# Patient Record
Sex: FEMALE | Race: WHITE | NOT HISPANIC OR LATINO | Employment: OTHER | URBAN - METROPOLITAN AREA
[De-identification: names, ages, dates, MRNs, and addresses within clinical notes are randomized per-mention and may not be internally consistent; named-entity substitution may affect disease eponyms.]

---

## 2018-01-09 DIAGNOSIS — M25.551 HIP PAIN, RIGHT: Primary | ICD-10-CM

## 2018-01-09 DIAGNOSIS — M25.551 RIGHT HIP PAIN: Primary | ICD-10-CM

## 2020-02-21 DIAGNOSIS — R06.02 SHORTNESS OF BREATH: ICD-10-CM

## 2020-11-03 ENCOUNTER — OFFICE VISIT (OUTPATIENT)
Dept: URGENT CARE | Facility: CLINIC | Age: 75
End: 2020-11-03
Payer: MEDICARE

## 2020-11-03 ENCOUNTER — HOSPITAL ENCOUNTER (OUTPATIENT)
Dept: RADIOLOGY | Facility: HOSPITAL | Age: 75
Discharge: HOME | End: 2020-11-03
Attending: PHYSICIAN ASSISTANT
Payer: MEDICARE

## 2020-11-03 VITALS
TEMPERATURE: 98 F | BODY MASS INDEX: 28.32 KG/M2 | OXYGEN SATURATION: 98 % | SYSTOLIC BLOOD PRESSURE: 148 MMHG | WEIGHT: 170 LBS | DIASTOLIC BLOOD PRESSURE: 87 MMHG | HEART RATE: 84 BPM | HEIGHT: 65 IN
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
## 2020-11-03 DIAGNOSIS — S42.91XA CLOSED FRACTURE OF RIGHT SHOULDER, INITIAL ENCOUNTER: Primary | ICD-10-CM

## 2020-11-03 DIAGNOSIS — T14.8XXA FRACTURE: ICD-10-CM

## 2020-11-03 DIAGNOSIS — S00.81XA ABRASION OF FACE, INITIAL ENCOUNTER: ICD-10-CM

## 2020-11-03 DIAGNOSIS — T14.90XA TRAUMA: ICD-10-CM

## 2020-11-03 DIAGNOSIS — T14.8XXA FRACTURE: Primary | ICD-10-CM

## 2020-11-03 PROBLEM — E03.9 ACQUIRED HYPOTHYROIDISM: Status: ACTIVE | Noted: 2020-11-03

## 2020-11-03 PROBLEM — E78.00 PURE HYPERCHOLESTEROLEMIA: Status: ACTIVE | Noted: 2020-11-03

## 2020-11-03 PROBLEM — I10 ESSENTIAL HYPERTENSION: Status: ACTIVE | Noted: 2020-11-03

## 2020-11-03 PROCEDURE — 73200 CT SHOULDER WITHOUT CONTRAST RIGHT: ICD-10-PCS | Mod: 26,RT,, | Performed by: RADIOLOGY

## 2020-11-03 PROCEDURE — 96372 THER/PROPH/DIAG INJ SC/IM: CPT | Mod: S$GLB,,, | Performed by: FAMILY MEDICINE

## 2020-11-03 PROCEDURE — 99215 PR OFFICE/OUTPT VISIT, EST, LEVL V, 40-54 MIN: ICD-10-PCS | Mod: 25,S$GLB,, | Performed by: FAMILY MEDICINE

## 2020-11-03 PROCEDURE — 73030 X-RAY EXAM OF SHOULDER: CPT | Mod: FY,RT,S$GLB, | Performed by: RADIOLOGY

## 2020-11-03 PROCEDURE — 96372 PR INJECTION,THERAP/PROPH/DIAG2ST, IM OR SUBCUT: ICD-10-PCS | Mod: S$GLB,,, | Performed by: FAMILY MEDICINE

## 2020-11-03 PROCEDURE — 73200 CT UPPER EXTREMITY W/O DYE: CPT | Mod: 26,RT,, | Performed by: RADIOLOGY

## 2020-11-03 PROCEDURE — 99215 OFFICE O/P EST HI 40 MIN: CPT | Mod: 25,S$GLB,, | Performed by: FAMILY MEDICINE

## 2020-11-03 PROCEDURE — 73562 X-RAY EXAM OF KNEE 3: CPT | Mod: FY,RT,S$GLB, | Performed by: RADIOLOGY

## 2020-11-03 PROCEDURE — 73200 CT UPPER EXTREMITY W/O DYE: CPT | Mod: TC,RT

## 2020-11-03 PROCEDURE — 73562 XR KNEE 3 VIEW RIGHT: ICD-10-PCS | Mod: FY,RT,S$GLB, | Performed by: RADIOLOGY

## 2020-11-03 PROCEDURE — 73030 XR SHOULDER COMPLETE 2 OR MORE VIEWS RIGHT: ICD-10-PCS | Mod: FY,RT,S$GLB, | Performed by: RADIOLOGY

## 2020-11-03 RX ORDER — LOSARTAN POTASSIUM 25 MG/1
TABLET ORAL
Status: ON HOLD | COMMUNITY
Start: 2020-08-20 | End: 2022-06-13

## 2020-11-03 RX ORDER — LEVOTHYROXINE SODIUM 50 UG/1
TABLET ORAL
COMMUNITY
Start: 2020-09-04

## 2020-11-03 RX ORDER — HYDROCHLOROTHIAZIDE 25 MG/1
TABLET ORAL
Status: ON HOLD | COMMUNITY
Start: 2020-08-19 | End: 2022-06-13

## 2020-11-03 RX ORDER — FLUTICASONE PROPIONATE 50 MCG
SPRAY, SUSPENSION (ML) NASAL
COMMUNITY
Start: 2020-08-20

## 2020-11-03 RX ORDER — KETOROLAC TROMETHAMINE 30 MG/ML
30 INJECTION, SOLUTION INTRAMUSCULAR; INTRAVENOUS
Status: COMPLETED | OUTPATIENT
Start: 2020-11-03 | End: 2020-11-03

## 2020-11-03 RX ORDER — METOPROLOL SUCCINATE 50 MG/1
TABLET, EXTENDED RELEASE ORAL NIGHTLY
COMMUNITY
Start: 2020-08-20

## 2020-11-03 RX ORDER — OMEPRAZOLE 20 MG/1
CAPSULE, DELAYED RELEASE ORAL EVERY MORNING
COMMUNITY
Start: 2020-10-07

## 2020-11-03 RX ORDER — SERTRALINE HYDROCHLORIDE 50 MG/1
TABLET, FILM COATED ORAL NIGHTLY
COMMUNITY
Start: 2020-09-11

## 2020-11-03 RX ORDER — HYDROCODONE BITARTRATE AND ACETAMINOPHEN 7.5; 325 MG/1; MG/1
1 TABLET ORAL EVERY 6 HOURS PRN
Qty: 30 TABLET | Refills: 0 | Status: SHIPPED | OUTPATIENT
Start: 2020-11-03 | End: 2022-06-07 | Stop reason: CLARIF

## 2020-11-03 RX ORDER — ATORVASTATIN CALCIUM 20 MG/1
TABLET, FILM COATED ORAL NIGHTLY
COMMUNITY
Start: 2020-09-10

## 2020-11-03 RX ORDER — CETIRIZINE HYDROCHLORIDE 10 MG/1
TABLET ORAL NIGHTLY PRN
COMMUNITY
Start: 2020-07-31

## 2020-11-03 RX ADMIN — KETOROLAC TROMETHAMINE 30 MG: 30 INJECTION, SOLUTION INTRAMUSCULAR; INTRAVENOUS at 12:11

## 2020-11-03 NOTE — PROGRESS NOTES
"Subjective:       Patient ID: Brunilda Bass is a 75 y.o. female.    Vitals:  height is 5' 5" (1.651 m) and weight is 77.1 kg (170 lb). Her temperature is 97.7 °F (36.5 °C). Her blood pressure is 148/87 (abnormal) and her pulse is 84. Her oxygen saturation is 98%.     Chief Complaint: Shoulder Injury (right)    Patient fell down 1 stair after voting this morning. Pt denies any kidney problems , she admits to GERD for which she takes meds but no hx of ulcers    Fall  Fall occurred: fell down 1 stair and fell onto concrete. She landed on concrete. The point of impact was the right shoulder and right knee. The pain is present in the right knee and right shoulder. The pain is at a severity of 8/10. The pain is severe. The symptoms are aggravated by movement. Pertinent negatives include no abdominal pain, hematuria or loss of consciousness. She has tried ice for the symptoms. The treatment provided mild relief.       Constitution: Negative for fatigue.   HENT: Negative for facial swelling and facial trauma.    Neck: Negative for neck stiffness.   Cardiovascular: Negative for chest trauma.   Eyes: Negative for eye trauma, double vision and blurred vision.   Gastrointestinal: Negative for abdominal trauma, abdominal pain and rectal bleeding.   Genitourinary: Negative for hematuria, missed menses, genital trauma and pelvic pain.   Musculoskeletal: Positive for joint pain. Negative for pain, trauma, joint swelling and abnormal ROM of joint.   Skin: Negative for color change, wound, abrasion, laceration and bruising.   Neurological: Negative for dizziness, history of vertigo, light-headedness, coordination disturbances, altered mental status and loss of consciousness.   Hematologic/Lymphatic: Negative for history of bleeding disorder.   Psychiatric/Behavioral: Negative for altered mental status.       Objective:      Physical Exam   Constitutional: She is oriented to person, place, and time. obesity  HENT:   Head: " Normocephalic.   Nose:      Comments: Small abrasion at bridge of nose  Mouth/Throat: Mucous membranes are moist.      Comments: Swelling of upper lip.   Eyes: Pupils are equal, round, and reactive to light. extraocular movement intact  Neck: Normal range of motion. Neck supple. No muscular tenderness present.   Abdominal: Normal appearance.   Musculoskeletal:         General: Tenderness and signs of injury present. No swelling or deformity.      Comments: Pain on palpation of patella, abrasion on the surface of the patella as well. Otherwise grossly normal ROM no palpable effusion in the joint,  Left knee with superficial abrasions over the surface of the patella as well.   Pt unable to stand or walk without assisitance.     Rt shoulder with no obvious deformity-though possibly subluxed inferiorly? Pt unable to abduct the shoulder greater than 20 degrees. Mild diffuse pain on palpation over the shoulder   Good radial and ulnar pulses at the wrist   Neurological: She is alert and oriented to person, place, and time.   Skin: Skin is warm and dry. Capillary refill takes 2 to 3 seconds. Psychiatric: Her behavior is normal. Mood, judgment and thought content normal.   Nursing note and vitals reviewed.        Assessment:       1. Closed fracture of right shoulder, initial encounter    2. Trauma    3. Abrasion of face, initial encounter    4. Fall, initial encounter        Plan:         Closed fracture of right shoulder, initial encounter  -     Ambulatory referral/consult to Orthopedics  -     SLING FOR HOME USE  -     HYDROcodone-acetaminophen (NORCO) 7.5-325 mg per tablet; Take 1 tablet by mouth every 6 (six) hours as needed for Pain.  Dispense: 30 tablet; Refill: 0    Trauma  -     X-ray Shoulder 2 or More Views Right; Future; Expected date: 11/03/2020  -     XR KNEE 3 VIEW RIGHT; Future; Expected date: 11/03/2020  -     ketorolac injection 30 mg    Abrasion of face, initial encounter    Fall, initial  encounter      Patient Instructions     Shoulder Fracture  You have a break (fracture) of the shoulder. This may be a small crack in the bone. Or it may be a major break with the broken parts pushed out of position.     If you have only a crack in the bone and no bone fragments are out of place, you will probably be treated with a shoulder immobilizer. This is a special type of sling. Casts are usually not used for this type of fracture. Your bone should heal in 4 to 8 weeks. More serious injuries may need surgery to put the bones back into the correct position for healing.  Home care  Follow these tips to care for yourself at home:  · Leave the shoulder immobilizer in place. This will support the injured arm at your side. This is the best position for the bone to heal.  · The shoulder immobilizer is adjustable. If it becomes loose, adjust it so that your forearm is level with the ground (horizontal). Your hand should be level with your elbow.  · Apply an ice pack to the injured area for 20 minutes every 1 to 2 hours the first day. You can make an ice pack by putting ice cubes in a plastic bag. A bag of frozen peas or something similar works well too. Wrap the bag in a towel before putting it on your shoulder. Continue with ice packs 3 to 4 times a day for the next 2 to 3 days. Then use the ice as needed to relieve pain and swelling.  · You may take acetaminophen or ibuprofen to relieve pain, unless another pain medicine was prescribed. If you have chronic liver or kidney disease or ever had a stomach ulcer or gastrointestinal bleeding, talk with your doctor before using these medicines.  · Dont take the sling off before your next exam unless you were told to do so. Ask if you should move your elbow, wrist, and hand.  Follow-up care  Follow up with your healthcare provider, or as advised.  A shoulder joint will become stiff if left in a sling for too long. Ask your doctor when it is safe to begin range-of-motion  exercises.  When to seek medical advice  Call your healthcare provider right away if any of these occur:  · Your fingers become swollen, cold, blue, numb, or tingly  · Your shoulder or upper arm swells a lot or looks very bruised  · The pain in your shoulder gets worse  · The splint or immobilizer breaks  · You have a fever or chills  Date Last Reviewed: 8/1/2016  © 9429-5607 [employer]. [address] [place] [address], [place] [address]. All rights reserved. This information is not intended as a substitute for professional medical care. Always follow your healthcare professional's instructions.

## 2020-11-03 NOTE — PATIENT INSTRUCTIONS
Shoulder Fracture  You have a break (fracture) of the shoulder. This may be a small crack in the bone. Or it may be a major break with the broken parts pushed out of position.     If you have only a crack in the bone and no bone fragments are out of place, you will probably be treated with a shoulder immobilizer. This is a special type of sling. Casts are usually not used for this type of fracture. Your bone should heal in 4 to 8 weeks. More serious injuries may need surgery to put the bones back into the correct position for healing.  Home care  Follow these tips to care for yourself at home:  · Leave the shoulder immobilizer in place. This will support the injured arm at your side. This is the best position for the bone to heal.  · The shoulder immobilizer is adjustable. If it becomes loose, adjust it so that your forearm is level with the ground (horizontal). Your hand should be level with your elbow.  · Apply an ice pack to the injured area for 20 minutes every 1 to 2 hours the first day. You can make an ice pack by putting ice cubes in a plastic bag. A bag of frozen peas or something similar works well too. Wrap the bag in a towel before putting it on your shoulder. Continue with ice packs 3 to 4 times a day for the next 2 to 3 days. Then use the ice as needed to relieve pain and swelling.  · You may take acetaminophen or ibuprofen to relieve pain, unless another pain medicine was prescribed. If you have chronic liver or kidney disease or ever had a stomach ulcer or gastrointestinal bleeding, talk with your doctor before using these medicines.  · Dont take the sling off before your next exam unless you were told to do so. Ask if you should move your elbow, wrist, and hand.  Follow-up care  Follow up with your healthcare provider, or as advised.  A shoulder joint will become stiff if left in a sling for too long. Ask your doctor when it is safe to begin range-of-motion exercises.  When to seek medical  advice  Call your healthcare provider right away if any of these occur:  · Your fingers become swollen, cold, blue, numb, or tingly  · Your shoulder or upper arm swells a lot or looks very bruised  · The pain in your shoulder gets worse  · The splint or immobilizer breaks  · You have a fever or chills  Date Last Reviewed: 8/1/2016  © 2457-8986 [employer]. [address] [place] [address], [place] [address]. All rights reserved. This information is not intended as a substitute for professional medical care. Always follow your healthcare professional's instructions.

## 2020-11-04 ENCOUNTER — OFFICE VISIT (OUTPATIENT)
Dept: ORTHOPEDICS | Facility: CLINIC | Age: 75
End: 2020-11-04
Payer: MEDICARE

## 2020-11-04 ENCOUNTER — HOSPITAL ENCOUNTER (OUTPATIENT)
Dept: RADIOLOGY | Facility: HOSPITAL | Age: 75
Discharge: HOME | End: 2020-11-04
Attending: PHYSICIAN ASSISTANT
Payer: MEDICARE

## 2020-11-04 VITALS
BODY MASS INDEX: 28.32 KG/M2 | TEMPERATURE: 97 F | SYSTOLIC BLOOD PRESSURE: 138 MMHG | DIASTOLIC BLOOD PRESSURE: 80 MMHG | WEIGHT: 170 LBS | RESPIRATION RATE: 18 BRPM | HEART RATE: 79 BPM | HEIGHT: 65 IN

## 2020-11-04 DIAGNOSIS — S42.291A CLOSED 4-PART FRACTURE OF PROXIMAL HUMERUS, RIGHT, INITIAL ENCOUNTER: ICD-10-CM

## 2020-11-04 DIAGNOSIS — T14.8XXA FRACTURE: Primary | ICD-10-CM

## 2020-11-04 DIAGNOSIS — T14.8XXA FRACTURE: ICD-10-CM

## 2020-11-04 DIAGNOSIS — S82.031A DISPLACED TRANSVERSE FRACTURE OF RIGHT PATELLA, INITIAL ENCOUNTER FOR CLOSED FRACTURE: ICD-10-CM

## 2020-11-04 PROCEDURE — 99204 OFFICE O/P NEW MOD 45 MIN: CPT | Mod: S$PBB,,, | Performed by: PHYSICIAN ASSISTANT

## 2020-11-04 PROCEDURE — 99213 OFFICE O/P EST LOW 20 MIN: CPT | Mod: PBBFAC,25 | Performed by: PHYSICIAN ASSISTANT

## 2020-11-04 PROCEDURE — 99204 PR OFFICE/OUTPT VISIT, NEW, LEVL IV, 45-59 MIN: ICD-10-PCS | Mod: S$PBB,,, | Performed by: PHYSICIAN ASSISTANT

## 2020-11-04 PROCEDURE — 73060 X-RAY EXAM OF HUMERUS: CPT | Mod: 26,RT,, | Performed by: RADIOLOGY

## 2020-11-04 PROCEDURE — 99999 PR PBB SHADOW E&M-EST. PATIENT-LVL III: ICD-10-PCS | Mod: PBBFAC,,, | Performed by: PHYSICIAN ASSISTANT

## 2020-11-04 PROCEDURE — 99999 PR PBB SHADOW E&M-EST. PATIENT-LVL III: CPT | Mod: PBBFAC,,, | Performed by: PHYSICIAN ASSISTANT

## 2020-11-04 PROCEDURE — 73060 XR HUMERUS 2 VIEW RIGHT: ICD-10-PCS | Mod: 26,RT,, | Performed by: RADIOLOGY

## 2020-11-04 PROCEDURE — 73060 X-RAY EXAM OF HUMERUS: CPT | Mod: TC,RT

## 2020-11-04 RX ORDER — METHOCARBAMOL 500 MG/1
500 TABLET, FILM COATED ORAL 4 TIMES DAILY
Qty: 40 TABLET | Refills: 0 | Status: SHIPPED | OUTPATIENT
Start: 2020-11-04 | End: 2020-11-14

## 2020-11-04 NOTE — LETTER
November 4, 2020      Tatiana White,   [address] [facility] [address]           [facility] 5th Fl  [address], [address]  [facility] [address]  Phone: [phone]          Patient: Brunilda Bass   MR Number: [id number]   YOB: 1945   Date of Visit: 11/4/2020       Dear Dr. Tatiana White:    Thank you for referring Brunilda Bass to me for evaluation. Attached you will find relevant portions of my assessment and plan of care.    If you have questions, please do not hesitate to call me. I look forward to following Brunilda Bass along with you.    Sincerely,    Rachel Ruano PA-C    Enclosure  CC:  No Recipients    If you would like to receive this communication electronically, please contact [email or web address] or [phone] to request more information on [employer] Link access.    For providers and/or their staff who would like to refer a patient to [facility], please contact us through our one-stop-shop provider referral line, [facility], at [phone].    If you feel you have received this communication in error or would no longer like to receive these types of communications, please e-mail [email or web address]

## 2020-11-04 NOTE — PROGRESS NOTES
Subjective:      Patient ID: Brunilda Bass is a 75 y.o. female.    Chief Complaint: No chief complaint on file.    HPI    Patient is a 75 year old female with PMHX of HTN, controlled following up from urgent care because on 11/03/2020 fell from standing height after voting. Patients injuries included  1. 4 part comminuted, mildly impacted, fracture at the surgical humeral neck with anterior displacement of the humerus relative to the humeral head. Patient has been treated in a sling.   2. Nondisplaced right transverse patella fracture. weight bearing as tolerated, no issues, abrasion to anterior knee.   She reports that her pain is controlled. She is taking extra strength tylenol for pain. Denied numbness or tingling.      Lives at home with daughter   Independent community ambulator, no gait aids  Denies history of stroke, heart attack, cancer, blood clot, diabetes  Denies tobacco use  Denied chronic pain medication use prior to injury    Review of Systems   Constitution: Negative for chills and fever.   Cardiovascular: Negative for chest pain.   Respiratory: Negative for cough and shortness of breath.    Skin: Negative for color change, dry skin, itching, nail changes, poor wound healing and rash.   Musculoskeletal: Positive for falls.        Right patella fracture and humerus fracture   Neurological: Negative for dizziness.   Psychiatric/Behavioral: Negative for altered mental status. The patient is not nervous/anxious.    All other systems reviewed and are negative.        Objective:      General    Constitutional: She is oriented to person, place, and time. She appears well-developed and well-nourished. No distress.   HENT:   Head: Atraumatic.   Eyes: Conjunctivae are normal.   Cardiovascular: Normal rate.    Pulmonary/Chest: Effort normal.   Neurological: She is alert and oriented to person, place, and time.   Psychiatric: She has a normal mood and affect. Her behavior is normal.           Right Knee Exam      Comments:  Abrasion anterior knee  full range of motion  no TTP    Left Knee Exam     Comments:  Abrasion anterior knee  full range of motion  no TTPRight Shoulder Exam     Inspection/Observation   Swelling: present  Bruising: present    Comments:  Arrived to clinic with her daughter, sling in place, removed for exam  full range of motion of fingers wrist and elbow no pain.   range of motion shoulder not tested due to fracture.   NVI     RADS: reviewed by myself and   SHOULDER: comminuted, mildly impacted, fracture at the surgical humeral neck with anterior displacement of the humerus relative to the humeral head.  There is also fracture of the greater tuberosity.  Possible fracture at the anatomical neck as well.      KNEE:   Non displaced transverse patella fracture.           Assessment:       Encounter Diagnoses   Name Primary?    Fracture Yes    Closed 4-part fracture of proximal humerus, right, initial encounter     Displaced transverse fracture of right patella, initial encounter for closed fracture           Plan:     Discussed treatment options/ plan with the patient. Per     HUMERUS:  operative ( ORIF vs reverse total shoulder) vs non-operative treatment. At this time she would like to do non-operative treatment. She understands that she is at increased risk of development of arthritis and that she may require surgical intervention in the future.    She will continue sling and is to me NWB    KNEE: non operative treatment   Knee brace locked in extension weight bearing as tolerated in brace.    RICE to reduce swelling and pain.     Pain medication: will continue OTC Tylenol, prescription written for robaxin if needed.   return to clinic in 1 week at that time x-ray of her shoulder 2 view AP scap Y and her knee knee 2 view AP lat NWB is needed.

## 2020-11-09 ENCOUNTER — TELEPHONE (OUTPATIENT)
Dept: ORTHOPEDICS | Facility: CLINIC | Age: 75
End: 2020-11-09

## 2020-11-09 NOTE — TELEPHONE ENCOUNTER
Left message for patient to return call.  Regarding appointment on 11/11/20. Pt can come at 10:00 am on 11/11/20.

## 2020-11-09 NOTE — TELEPHONE ENCOUNTER
----- Message from Amanda Hannon sent at 11/9/2020 10:53 AM CST -----        Patient would like to have an earlier appt that day. Possible opening 12:30 that day wont let me schedule.  Please contact daughter to advise as to if can change to an earlier appt that day.  She is concerned about getting home with the 1:45 appt to be there when her kids get home      Daughter can be reached @# [phone]  (Yamile)

## 2020-11-11 ENCOUNTER — OFFICE VISIT (OUTPATIENT)
Dept: ORTHOPEDICS | Facility: CLINIC | Age: 75
End: 2020-11-11
Payer: MEDICARE

## 2020-11-11 ENCOUNTER — HOSPITAL ENCOUNTER (OUTPATIENT)
Dept: RADIOLOGY | Facility: HOSPITAL | Age: 75
Discharge: HOME | End: 2020-11-11
Attending: PHYSICIAN ASSISTANT
Payer: MEDICARE

## 2020-11-11 DIAGNOSIS — S42.291A CLOSED 4-PART FRACTURE OF PROXIMAL HUMERUS, RIGHT, INITIAL ENCOUNTER: Primary | ICD-10-CM

## 2020-11-11 DIAGNOSIS — M25.561 ACUTE PAIN OF RIGHT KNEE: ICD-10-CM

## 2020-11-11 DIAGNOSIS — S42.291A CLOSED 4-PART FRACTURE OF PROXIMAL HUMERUS, RIGHT, INITIAL ENCOUNTER: ICD-10-CM

## 2020-11-11 DIAGNOSIS — S82.031A DISPLACED TRANSVERSE FRACTURE OF RIGHT PATELLA, INITIAL ENCOUNTER FOR CLOSED FRACTURE: ICD-10-CM

## 2020-11-11 PROCEDURE — 73030 X-RAY EXAM OF SHOULDER: CPT | Mod: TC,RT

## 2020-11-11 PROCEDURE — 99999 PR PBB SHADOW E&M-EST. PATIENT-LVL III: CPT | Mod: PBBFAC,,, | Performed by: PHYSICIAN ASSISTANT

## 2020-11-11 PROCEDURE — 73560 X-RAY EXAM OF KNEE 1 OR 2: CPT | Mod: 26,RT,, | Performed by: RADIOLOGY

## 2020-11-11 PROCEDURE — 99999 PR PBB SHADOW E&M-EST. PATIENT-LVL III: ICD-10-PCS | Mod: PBBFAC,,, | Performed by: PHYSICIAN ASSISTANT

## 2020-11-11 PROCEDURE — 99213 OFFICE O/P EST LOW 20 MIN: CPT | Mod: PBBFAC,25 | Performed by: PHYSICIAN ASSISTANT

## 2020-11-11 PROCEDURE — 73030 X-RAY EXAM OF SHOULDER: CPT | Mod: 26,RT,, | Performed by: RADIOLOGY

## 2020-11-11 PROCEDURE — 73560 XR KNEE 1 OR 2 VIEW RIGHT: ICD-10-PCS | Mod: 26,RT,, | Performed by: RADIOLOGY

## 2020-11-11 PROCEDURE — 73030 XR SHOULDER COMPLETE 2 OR MORE VIEWS RIGHT: ICD-10-PCS | Mod: 26,RT,, | Performed by: RADIOLOGY

## 2020-11-11 PROCEDURE — 73560 X-RAY EXAM OF KNEE 1 OR 2: CPT | Mod: TC,RT

## 2020-11-11 PROCEDURE — 99214 OFFICE O/P EST MOD 30 MIN: CPT | Mod: S$PBB,,, | Performed by: PHYSICIAN ASSISTANT

## 2020-11-11 PROCEDURE — 99214 PR OFFICE/OUTPT VISIT, EST, LEVL IV, 30-39 MIN: ICD-10-PCS | Mod: S$PBB,,, | Performed by: PHYSICIAN ASSISTANT

## 2020-11-11 NOTE — PROGRESS NOTES
Subjective:      Patient ID: Brunilda Bass is a 75 y.o. female.    Chief Complaint: No chief complaint on file.    HPI    Patient is a 75 year old female with PMHX of HTN, controlled following up from urgent care because on 11/03/2020 fell from standing height after voting. Patients injuries included  1. 4 part comminuted, mildly impacted, fracture at the surgical humeral neck with anterior displacement of the humerus relative to the humeral head. Patient has been treated in a sling.   2. Nondisplaced right transverse patella fracture. weight bearing as tolerated, no issues, abrasion to anterior knee.   She reports that her pain is controlled. She is taking extra strength tylenol for pain. Denied numbness or tingling.   She has not been really wearing the knee brace but has tried to keep her knee straight. She reports that both the pain in her knee and shoulder are better.      Lives at home with daughter   Independent community ambulator, no gait aids  Denies history of stroke, heart attack, cancer, blood clot, diabetes  Denies tobacco use  Denied chronic pain medication use prior to injury    Review of Systems   Constitution: Negative for chills and fever.   Cardiovascular: Negative for chest pain.   Respiratory: Negative for cough and shortness of breath.    Skin: Negative for color change, dry skin, itching, nail changes, poor wound healing and rash.   Musculoskeletal: Positive for falls.        Right patella fracture and humerus fracture   Neurological: Negative for dizziness.   Psychiatric/Behavioral: Negative for altered mental status. The patient is not nervous/anxious.    All other systems reviewed and are negative.        Objective:      General    Constitutional: She is oriented to person, place, and time. She appears well-developed and well-nourished. No distress.   HENT:   Head: Atraumatic.   Eyes: Conjunctivae are normal.   Cardiovascular: Normal rate.    Pulmonary/Chest: Effort normal.   Neurological:  She is alert and oriented to person, place, and time.   Psychiatric: She has a normal mood and affect. Her behavior is normal.           Right Knee Exam     Comments:  Abrasion anterior knee  full range of motion  no TTP    Left Knee Exam     Comments:  Abrasion anterior knee  full range of motion  no TTPRight Shoulder Exam     Inspection/Observation   Swelling: present  Bruising: present    Comments:  Arrived to clinic with her daughter, sling in place, removed for exam  full range of motion of fingers wrist and elbow no pain.   range of motion shoulder not tested due to fracture.   NVI     RADS: reviewed by myself and   SHOULDER: comminuted, mildly impacted, fracture at the surgical humeral neck with anterior displacement of the humerus relative to the humeral head.  There is also fracture of the greater tuberosity.  Possible fracture at the anatomical neck as well.      KNEE:   Non displaced transverse patella fracture.     Assessment:       Encounter Diagnoses   Name Primary?    Closed 4-part fracture of proximal humerus, right, initial encounter Yes    Acute pain of right knee     Displaced transverse fracture of right patella, initial encounter for closed fracture           Plan:     Discussed treatment options/ plan with the patient. Per     HUMERUS:  operative ( ORIF vs reverse total shoulder) vs non-operative treatment. At this time she would like to do non-operative treatment. She understands that she is at increased risk of development of arthritis and that she may require surgical intervention in the future.    She will continue sling and is to me NWB ok to move finger wrist and elbow.     KNEE: non operative treatment   Knee brace begin range of motion has been stable even though not wearing. .    RICE to reduce swelling and pain.     Pain medication: will continue OTC Tylenol, prescription written for robaxin if needed.   return to clinic in 2 week at that time x-ray of her  shoulder 2 view AP scap Y and her knee knee 2 view AP lat NWB is needed.   At time consider d/c knee brace and order for PT for her shoulder.

## 2020-11-30 ENCOUNTER — OFFICE VISIT (OUTPATIENT)
Dept: ORTHOPEDICS | Facility: CLINIC | Age: 75
End: 2020-11-30
Payer: MEDICARE

## 2020-11-30 ENCOUNTER — HOSPITAL ENCOUNTER (OUTPATIENT)
Dept: RADIOLOGY | Facility: HOSPITAL | Age: 75
Discharge: HOME | End: 2020-11-30
Attending: PHYSICIAN ASSISTANT
Payer: MEDICARE

## 2020-11-30 VITALS — RESPIRATION RATE: 18 BRPM | TEMPERATURE: 98 F | HEIGHT: 65 IN | BODY MASS INDEX: 28.16 KG/M2 | WEIGHT: 169 LBS

## 2020-11-30 DIAGNOSIS — S42.291D CLOSED 4-PART FRACTURE OF PROXIMAL HUMERUS WITH ROUTINE HEALING, RIGHT: Primary | ICD-10-CM

## 2020-11-30 DIAGNOSIS — S42.291D CLOSED 4-PART FRACTURE OF PROXIMAL HUMERUS WITH ROUTINE HEALING, RIGHT: ICD-10-CM

## 2020-11-30 DIAGNOSIS — S82.031D: ICD-10-CM

## 2020-11-30 PROCEDURE — 73560 X-RAY EXAM OF KNEE 1 OR 2: CPT | Mod: TC,RT

## 2020-11-30 PROCEDURE — 99999 PR PBB SHADOW E&M-EST. PATIENT-LVL IV: CPT | Mod: PBBFAC,,, | Performed by: PHYSICIAN ASSISTANT

## 2020-11-30 PROCEDURE — 99213 OFFICE O/P EST LOW 20 MIN: CPT | Mod: S$PBB,,, | Performed by: PHYSICIAN ASSISTANT

## 2020-11-30 PROCEDURE — 73030 X-RAY EXAM OF SHOULDER: CPT | Mod: 26,RT,, | Performed by: RADIOLOGY

## 2020-11-30 PROCEDURE — 73560 XR KNEE 1 OR 2 VIEW RIGHT: ICD-10-PCS | Mod: 26,RT,, | Performed by: RADIOLOGY

## 2020-11-30 PROCEDURE — 99214 OFFICE O/P EST MOD 30 MIN: CPT | Mod: PBBFAC,25 | Performed by: PHYSICIAN ASSISTANT

## 2020-11-30 PROCEDURE — 73030 X-RAY EXAM OF SHOULDER: CPT | Mod: TC,RT

## 2020-11-30 PROCEDURE — 99213 PR OFFICE/OUTPT VISIT, EST, LEVL III, 20-29 MIN: ICD-10-PCS | Mod: S$PBB,,, | Performed by: PHYSICIAN ASSISTANT

## 2020-11-30 PROCEDURE — 99999 PR PBB SHADOW E&M-EST. PATIENT-LVL IV: ICD-10-PCS | Mod: PBBFAC,,, | Performed by: PHYSICIAN ASSISTANT

## 2020-11-30 PROCEDURE — 73030 XR SHOULDER COMPLETE 2 OR MORE VIEWS RIGHT: ICD-10-PCS | Mod: 26,RT,, | Performed by: RADIOLOGY

## 2020-11-30 PROCEDURE — 73560 X-RAY EXAM OF KNEE 1 OR 2: CPT | Mod: 26,RT,, | Performed by: RADIOLOGY

## 2020-11-30 NOTE — PROGRESS NOTES
Subjective:      Patient ID: Brunilda Bass is a 75 y.o. female.    Chief Complaint: No chief complaint on file.    HPI    Patient is a 75 year old female with PMHX of HTN, controlled following up from urgent care because on 11/03/2020 fell from standing height after voting. Patients injuries included  1. 4 part comminuted, mildly impacted, fracture at the surgical humeral neck with anterior displacement of the humerus relative to the humeral head. Patient has been treated in a sling.   2. Nondisplaced right transverse patella fracture. weight bearing as tolerated, no issues, abrasion to anterior knee.   She reports that her pain is controlled. She is taking extra strength tylenol for pain. Denied numbness or tingling.   She reports that both the pain in her knee and shoulder are better.      Lives at home with daughter   Independent community ambulator, no gait aids  Denies history of stroke, heart attack, cancer, blood clot, diabetes  Denies tobacco use  Denied chronic pain medication use prior to injury    Review of Systems   Constitution: Negative for chills and fever.   Cardiovascular: Negative for chest pain.   Respiratory: Negative for cough and shortness of breath.    Skin: Negative for color change, dry skin, itching, nail changes, poor wound healing and rash.   Musculoskeletal: Positive for falls.        Right patella fracture and humerus fracture   Neurological: Negative for dizziness.   Psychiatric/Behavioral: Negative for altered mental status. The patient is not nervous/anxious.    All other systems reviewed and are negative.        Objective:      General    Constitutional: She is oriented to person, place, and time. She appears well-developed and well-nourished. No distress.   HENT:   Head: Atraumatic.   Eyes: Conjunctivae are normal.   Cardiovascular: Normal rate.    Pulmonary/Chest: Effort normal.   Neurological: She is alert and oriented to person, place, and time.   Psychiatric: She has a normal  mood and affect. Her behavior is normal.           Right Knee Exam     Comments:  Abrasion anterior knee  full range of motion  no TTP    Left Knee Exam     Comments:  Abrasion anterior knee  full range of motion  no TTPRight Shoulder Exam     Inspection/Observation   Swelling: present  Bruising: present    Comments:  Arrived to clinic with her daughter, sling in place, removed for exam  full range of motion of fingers wrist and elbow no pain.   range of motion shoulder not tested due to fracture.   NVI     RADS: reviewed by myself   SHOULDER: comminuted, mildly impacted, fracture at the surgical humeral neck with anterior displacement of the humerus relative to the humeral head.  There is also fracture of the greater tuberosity.  Possible fracture at the anatomical neck as well.      KNEE:   Non displaced transverse patella fracture.     Assessment:       Encounter Diagnoses   Name Primary?    Closed 4-part fracture of proximal humerus with routine healing, right Yes    Closed disp transverse fracture of right patella with routine healing           Plan:     Discussed treatment options/ plan with the patient. Per     HUMERUS:  operative ( ORIF vs reverse total shoulder) vs non-operative treatment. At this time she would like to do non-operative treatment. She understands that she is at increased risk of development of arthritis and that she may require surgical intervention in the future.    - Order placed for PT/OT, progressive range of motion PROM x 2 weeks, AAROm x 2 weeks then AROM, NWB    KNEE: non operative treatment   range of motion as tolerated, weight bearing as tolerated, wean from brace   RICE to reduce swelling and pain.     Pain medication: will continue OTC Tylenol, prescription written for robaxin if needed.   return to clinic in 6 week at that time x-ray of her shoulder 2 view AP scap Y and her knee knee 2 view AP lat NWB is needed.

## 2020-12-04 ENCOUNTER — TELEPHONE (OUTPATIENT)
Dept: ORTHOPEDICS | Facility: CLINIC | Age: 75
End: 2020-12-04

## 2020-12-04 NOTE — TELEPHONE ENCOUNTER
----- Message from Alta Cho sent at 12/4/2020  2:34 PM CST -----  Pt states that she did not receive call regarding home therapy   Pt#[phone]

## 2020-12-07 ENCOUNTER — TELEPHONE (OUTPATIENT)
Dept: ORTHOPEDICS | Facility: CLINIC | Age: 75
End: 2020-12-07

## 2020-12-07 NOTE — TELEPHONE ENCOUNTER
Called and left message to return my call.     ----- Message from Roselia Guerrero MA sent at 12/7/2020  1:09 PM CST -----  Contact: Vania Rios    ----- Message -----  From: Nicholas Mchugh  Sent: 12/7/2020  12:30 PM CST  To: [facility] Ramos Staff    Pt is refusing to have her therapy  pt said her arm is hurting call her back    Please Call     Contact  [phone]

## 2020-12-08 PROCEDURE — G0180 MD CERTIFICATION HHA PATIENT: HCPCS | Mod: ,,, | Performed by: ORTHOPAEDIC SURGERY

## 2020-12-08 PROCEDURE — G0180 PR HOME HEALTH MD CERTIFICATION: ICD-10-PCS | Mod: ,,, | Performed by: ORTHOPAEDIC SURGERY

## 2020-12-09 DIAGNOSIS — R91.8 NONSPECIFIC ABNORMAL FINDING OF LUNG FIELD: Primary | ICD-10-CM

## 2020-12-12 ENCOUNTER — HOSPITAL ENCOUNTER (OUTPATIENT)
Dept: RADIOLOGY | Facility: OTHER | Age: 75
Discharge: HOME | End: 2020-12-12
Attending: PHYSICIAN ASSISTANT
Payer: MEDICARE

## 2020-12-12 DIAGNOSIS — R91.8 NONSPECIFIC ABNORMAL FINDING OF LUNG FIELD: ICD-10-CM

## 2020-12-14 ENCOUNTER — DOCUMENT SCAN (OUTPATIENT)
Dept: HOME HEALTH SERVICES | Facility: HOSPITAL | Age: 75
End: 2020-12-14
Payer: MEDICARE

## 2020-12-17 ENCOUNTER — HOSPITAL ENCOUNTER (OUTPATIENT)
Dept: RADIOLOGY | Facility: OTHER | Age: 75
Discharge: HOME | End: 2020-12-17
Attending: PHYSICIAN ASSISTANT
Payer: MEDICARE

## 2020-12-17 PROCEDURE — 71250 CT THORAX DX C-: CPT | Mod: 26,,, | Performed by: SPECIALIST

## 2020-12-17 PROCEDURE — 71250 CT THORAX DX C-: CPT | Mod: TC

## 2020-12-17 PROCEDURE — 71250 CT CHEST WITHOUT CONTRAST: ICD-10-PCS | Mod: 26,,, | Performed by: SPECIALIST

## 2021-01-05 ENCOUNTER — EXTERNAL HOME HEALTH (OUTPATIENT)
Dept: HOME HEALTH SERVICES | Facility: HOSPITAL | Age: 76
End: 2021-01-05
Payer: MEDICARE

## 2021-01-08 ENCOUNTER — TELEPHONE (OUTPATIENT)
Dept: ORTHOPEDICS | Facility: CLINIC | Age: 76
End: 2021-01-08

## 2021-01-10 ENCOUNTER — IMMUNIZATION (OUTPATIENT)
Dept: INTERNAL MEDICINE | Facility: CLINIC | Age: 76
End: 2021-01-10
Payer: MEDICARE

## 2021-01-10 DIAGNOSIS — Z23 NEED FOR VACCINATION: ICD-10-CM

## 2021-01-10 PROCEDURE — 91300 COVID-19, MRNA, LNP-S, PF, 30 MCG/0.3 ML DOSE VACCINE: CPT | Mod: PBBFAC

## 2021-01-12 ENCOUNTER — TELEPHONE (OUTPATIENT)
Dept: ORTHOPEDICS | Facility: CLINIC | Age: 76
End: 2021-01-12

## 2021-01-14 ENCOUNTER — TELEPHONE (OUTPATIENT)
Dept: ORTHOPEDICS | Facility: CLINIC | Age: 76
End: 2021-01-14

## 2021-01-20 ENCOUNTER — TELEPHONE (OUTPATIENT)
Dept: ORTHOPEDICS | Facility: CLINIC | Age: 76
End: 2021-01-20

## 2021-01-22 ENCOUNTER — EXTERNAL HOME HEALTH (OUTPATIENT)
Dept: HOME HEALTH SERVICES | Facility: HOSPITAL | Age: 76
End: 2021-01-22
Payer: MEDICARE

## 2021-01-26 ENCOUNTER — HOSPITAL ENCOUNTER (OUTPATIENT)
Dept: RADIOLOGY | Facility: HOSPITAL | Age: 76
Discharge: HOME | End: 2021-01-26
Attending: PHYSICIAN ASSISTANT
Payer: MEDICARE

## 2021-01-26 ENCOUNTER — OFFICE VISIT (OUTPATIENT)
Dept: ORTHOPEDICS | Facility: CLINIC | Age: 76
End: 2021-01-26
Payer: MEDICARE

## 2021-01-26 VITALS — BODY MASS INDEX: 28.17 KG/M2 | TEMPERATURE: 98 F | WEIGHT: 169.06 LBS | HEIGHT: 65 IN

## 2021-01-26 DIAGNOSIS — S82.031D: ICD-10-CM

## 2021-01-26 DIAGNOSIS — S42.291D CLOSED 4-PART FRACTURE OF PROXIMAL HUMERUS WITH ROUTINE HEALING, RIGHT: ICD-10-CM

## 2021-01-26 DIAGNOSIS — S42.291D CLOSED 4-PART FRACTURE OF PROXIMAL HUMERUS WITH ROUTINE HEALING, RIGHT: Primary | ICD-10-CM

## 2021-01-26 DIAGNOSIS — M25.512 ACUTE PAIN OF LEFT SHOULDER: ICD-10-CM

## 2021-01-26 PROCEDURE — 99213 PR OFFICE/OUTPT VISIT, EST, LEVL III, 20-29 MIN: ICD-10-PCS | Mod: S$PBB,,, | Performed by: PHYSICIAN ASSISTANT

## 2021-01-26 PROCEDURE — 73030 X-RAY EXAM OF SHOULDER: CPT | Mod: TC,50

## 2021-01-26 PROCEDURE — 99213 OFFICE O/P EST LOW 20 MIN: CPT | Mod: S$PBB,,, | Performed by: PHYSICIAN ASSISTANT

## 2021-01-26 PROCEDURE — 99999 PR PBB SHADOW E&M-EST. PATIENT-LVL IV: ICD-10-PCS | Mod: PBBFAC,,, | Performed by: PHYSICIAN ASSISTANT

## 2021-01-26 PROCEDURE — 99214 OFFICE O/P EST MOD 30 MIN: CPT | Mod: PBBFAC,25 | Performed by: PHYSICIAN ASSISTANT

## 2021-01-26 PROCEDURE — 73560 X-RAY EXAM OF KNEE 1 OR 2: CPT | Mod: TC,RT

## 2021-01-26 PROCEDURE — 73560 X-RAY EXAM OF KNEE 1 OR 2: CPT | Mod: 26,RT,, | Performed by: RADIOLOGY

## 2021-01-26 PROCEDURE — 73030 XR SHOULDER COMPLETE 2 OR MORE VIEWS BILATERAL: ICD-10-PCS | Mod: 26,RT,, | Performed by: RADIOLOGY

## 2021-01-26 PROCEDURE — 99999 PR PBB SHADOW E&M-EST. PATIENT-LVL IV: CPT | Mod: PBBFAC,,, | Performed by: PHYSICIAN ASSISTANT

## 2021-01-26 PROCEDURE — 73030 X-RAY EXAM OF SHOULDER: CPT | Mod: 26,RT,, | Performed by: RADIOLOGY

## 2021-01-26 PROCEDURE — 73560 XR KNEE 1 OR 2 VIEW RIGHT: ICD-10-PCS | Mod: 26,RT,, | Performed by: RADIOLOGY

## 2021-01-31 ENCOUNTER — IMMUNIZATION (OUTPATIENT)
Dept: INTERNAL MEDICINE | Facility: CLINIC | Age: 76
End: 2021-01-31
Payer: MEDICARE

## 2021-01-31 DIAGNOSIS — Z23 NEED FOR VACCINATION: Primary | ICD-10-CM

## 2021-01-31 PROCEDURE — 91300 COVID-19, MRNA, LNP-S, PF, 30 MCG/0.3 ML DOSE VACCINE: CPT | Mod: PBBFAC

## 2021-01-31 PROCEDURE — 0002A COVID-19, MRNA, LNP-S, PF, 30 MCG/0.3 ML DOSE VACCINE: CPT | Mod: PBBFAC

## 2021-02-11 DIAGNOSIS — R91.8 OTHER NONSPECIFIC ABNORMAL FINDING OF LUNG FIELD: Primary | ICD-10-CM

## 2021-03-18 ENCOUNTER — HOSPITAL ENCOUNTER (OUTPATIENT)
Dept: RADIOLOGY | Facility: OTHER | Age: 76
Discharge: HOME | End: 2021-03-18
Attending: PHYSICIAN ASSISTANT
Payer: MEDICARE

## 2021-03-18 DIAGNOSIS — R91.8 OTHER NONSPECIFIC ABNORMAL FINDING OF LUNG FIELD: ICD-10-CM

## 2021-03-18 PROCEDURE — 71250 CT THORAX DX C-: CPT | Mod: 26,,, | Performed by: RADIOLOGY

## 2021-03-18 PROCEDURE — 71250 CT THORAX DX C-: CPT | Mod: TC

## 2021-03-18 PROCEDURE — 71250 CT CHEST WITHOUT CONTRAST: ICD-10-PCS | Mod: 26,,, | Performed by: RADIOLOGY

## 2022-06-07 DIAGNOSIS — S82.002B TYPE I OR II OPEN FRACTURE OF LEFT PATELLA, UNSPECIFIED FRACTURE MORPHOLOGY, INITIAL ENCOUNTER: Primary | ICD-10-CM

## 2022-06-07 DIAGNOSIS — S82.002A LEFT PATELLA FRACTURE: ICD-10-CM

## 2022-06-07 PROBLEM — S82.032B: Status: ACTIVE | Noted: 2022-06-07

## 2022-06-07 RX ORDER — SODIUM CHLORIDE 9 MG/ML
INJECTION, SOLUTION INTRAVENOUS CONTINUOUS
Status: CANCELLED | OUTPATIENT
Start: 2022-06-07

## 2022-06-07 RX ORDER — MUPIROCIN 20 MG/G
OINTMENT TOPICAL
Status: CANCELLED | OUTPATIENT
Start: 2022-06-07

## 2022-06-07 NOTE — H&P (VIEW-ONLY)
"[facility]  Orthopedics  Consult Note    Patient Name: Brunilda Bass  MRN: [id number]  Admission Date: 6/7/2022  Hospital Length of Stay: 0 days  Attending Provider: Lidia Freeman Jr., MD  Primary Care Provider: NORMA Klein    Patient information was obtained from patient, caregiver / friend, past medical records and ER records.     Inpatient consult to Orthopedic Surgery  Consult performed by: Enzo Bruce MD  Consult ordered by: Antonella Castillo PA-C        Subjective:     Principal Problem:Displaced transverse fracture of left patella, initial encounter for open fracture type I or II    Chief Complaint:   Chief Complaint   Patient presents with    Fall     Tripped and fell walking, unable to straighten L knee, swollen, hit face , no loc, not on blood thinners, r wrist pain        HPI: Brunilda Bass is a 76 y.o. female with PMH significant for HTN, hypothyroidism, and HPLD presenting with left knee pain after a fall. She reports that she was on her morning walk when she tripped on a curb and fell onto her left knee and face. Patient denies LOC. The patient denies prior hx of falls. Patient denies numbness and tingling. Denies any other musculoskeletal pain or injuries. No known history of prior left knee injury or surgery.  Walks w/out assisted devices at baseline. Doesn't take any anticoagulation at baseline. Last ate 7:30 AM 6/7/22.  They deny IV drug use.   They deny tobacco use.   They deny alcohol use.   They deny immunosuppressant medications.  They deny chemotherapy.  They deny radiation.         Past Medical History:   Diagnosis Date    Hyperlipidemia     Hypertension     Thyroid disease        Past Surgical History:   Procedure Laterality Date    TONSILLECTOMY         Review of patient's allergies indicates:   Allergen Reactions    Aspirin      "chest burns"    Lisinopril Other (See Comments)     Coughing       Current Facility-Administered Medications   Medication    " ceFAZolin 2 gram in dextrose 5% 100 mL IVPB (premix)    vancomycin in dextrose 5 % 1 gram/250 mL IVPB 1,000 mg     Current Outpatient Medications   Medication Sig    atorvastatin (LIPITOR) 20 MG tablet TK 1 T PO QD FOR CHOLESTEROL    cetirizine (ZYRTEC) 10 MG tablet TK 1 T PO 1 TIME PER DAY PRN  FOR SINUS SYMPTOMS    fluticasone propionate (FLONASE) 50 mcg/actuation nasal spray SHAKE LQ AND U 2 SPRAYS IEN QD UTD    hydroCHLOROthiazide (HYDRODIURIL) 25 MG tablet     levothyroxine (SYNTHROID) 50 MCG tablet TK 1 T PO D    losartan (COZAAR) 25 MG tablet     metoprolol succinate (TOPROL-XL) 50 MG 24 hr tablet TK 1 T PO 1 TIME PER DAY FOR HEART AND BLOOD PRESSURE    omeprazole (PRILOSEC) 20 MG capsule TK 1 C PO QD  BEFORE BREAKFAST  ON AN EMPTY STOMACH    sertraline (ZOLOFT) 50 MG tablet TK 1 T PO  ONCE D     Family History       Problem Relation (Age of Onset)    No Known Problems Mother, Father          Tobacco Use    Smoking status: Never Smoker    Smokeless tobacco: Never Used   Substance and Sexual Activity    Alcohol use: Not Currently    Drug use: Never    Sexual activity: Not on file     ROS  Constitutional: negative for fevers or chills  Eyes: negative visual changes or eye discharge  ENT: negative for ear pain or sore throat  Respiratory: negative for shortness of breath or cough  Cardiovascular: negative for chest pain or palpitations  Gastrointestinal: negative for abdominal pain, nausea, or vomiting  Genitourinary: negative for dysuria and flank pain  Neurological: negative for headaches or dizziness  Behavioral/Psych: negative for depression or anxiety  Musculoskeletal: positive for left knee pain    Objective:     Vital Signs (Most Recent):  Temp: 97.7 °F (36.5 °C) (06/07/22 0944)  Pulse: 67 (06/07/22 0944)  Resp: 18 (06/07/22 0944)  BP: 138/75 (06/07/22 0944)  SpO2: 99 % (06/07/22 0944) Vital Signs (24h Range):  Temp:  [97.7 °F (36.5 °C)] 97.7 °F (36.5 °C)  Pulse:  [67] 67  Resp:  [18]  "18  SpO2:  [99 %] 99 %  BP: (138)/(75) 138/75     Weight: 77.1 kg (170 lb)  Height: 5' (152.4 cm)  Body mass index is 33.2 kg/m².    No intake or output data in the 24 hours ending 06/07/22 1240    Ortho/SPM Exam  Gen:  No acute distress, well-developed, well nourished.  CV:  Peripherally well-perfused. 2+ radial pulses, symmetric.  Respiratory:  Normal respiratory effort. No accessory muscle use.   Head/Neck:  Normocephalic.  Atraumatic. TM. Neck supple.  Neuro: CN 2-12 grossly intact. No FND.      MSK:  Right Upper extremity  Inspection  - Skin intact throughout, no open wounds  - No swelling  - No ecchymosis, erythema, or signs of cellulitis  Palpation  - NonTTP throughout  Range of motion  - AROM and PROM of the shoulder, elbow, wrist, and hand intact without pain  Stability  - No evidence of joint dislocation or abnormal laxity  Neurovascular  - AIN/PIN/Radial/Median/Ulnar Nerves assessed in isolation without deficit  - Able to give thumbs up, make "OK" sign, cross IF/LF, abduct/adduct fingers, make fist  - SILT throughout  - Compartments soft  - Radial artery palpated  - Muscle tone normal    Left Upper extremity  Inspection  - Skin intact throughout, no open wounds  - No swelling  - No ecchymosis, erythema, or signs of cellulitis  Palpation  - NonTTP throughout  Range of motion  - AROM and PROM of the shoulder, elbow, wrist, and hand intact without pain  Stability  - No evidence of joint dislocation or abnormal laxity  Neurovascular  - AIN/PIN/Radial/Median/Ulnar Nerves assessed in isolation without deficit  - Able to give thumbs up, make "OK" sign, cross IF/LF, abduct/adduct fingers, make fist  - SILT throughout  - Compartments soft  - Radial artery palpated  - Muscle tone normal      Right Lower Extremity  Inspection  - Small abrasions to the anterior knee  - No swelling  - No ecchymosis, erythema, or signs of cellulitis  Palpation  - NonTTP throughout  Range of motion  - AROM and PROM of the hip, knee, " ankle, and foot intact without pain  Stability  - No evidence of joint dislocation or abnormal laxity  Neurovascular  - TA/EHL/Gastroc/FHL assessed in isolation without deficit  - SILT throughout  - Compartments soft  - WWP  - Negative Log roll  - Muscle tone normal      Left Lower Extremity  Inspection  - Small abrasions to the anterior knee of greater depth than contralateral side  - Edema to the knee and suprapatellar area  - No ecchymosis, erythema, or signs of cellulitis  Palpation  - TTP to the patella  Range of motion  - AROM and PROM of the hip, ankle, and foot intact without pain, minimal AROM of the knee, unable to perform SLR  Stability  - No evidence of joint dislocation or abnormal laxity  Neurovascular  - TA/EHL/Gastroc/FHL assessed in isolation without deficit  - SILT throughout  - Compartments soft  - WWP, 2+ DP  - Muscle tone normal    Spine/pelvis/axial body:  No tenderness to palpation of cervical, thoracic, or lumbar spine  Muscle tone normal    Significant Labs: CBC: No results for input(s): WBC, HGB, HCT, PLT in the last 48 hours.  CMP: No results for input(s): NA, K, CL, CO2, GLU, BUN, CREATININE, CALCIUM, PROT, ALBUMIN, BILITOT, ALKPHOS, AST, ALT, ANIONGAP, EGFRNONAA in the last 48 hours.    Invalid input(s): ESTGFAFRICA  All pertinent labs within the past 24 hours have been reviewed.    Significant Imaging: CT: I have reviewed all pertinent results/findings and my personal findings are:  patella fracture better categorized with air bubbles noted on axial, coronal, and sagittal series suggestive of open fracture given physical exam findings of anterior abrasion  X-Ray: I have reviewed all pertinent results/findings and my personal findings are:  left transverse patella fracture with displacement and oblique fracture line at the inferior patella, no other acute fracture or dislocation identified on X ray imaging though osteopenic bone noted throughout    Results for orders placed or performed  during the hospital encounter of 06/07/22 (from the past 2160 hour(s))   CT Knee Without Contrast Left    Impression    Transverse plane fracture of the patella with 2.5 cm of distraction.  Distal fragment appears comminuted.  Several punctate foci of gas concerning for open injury.      Electronically signed by: Roosevelt Preston MD  Date:    06/07/2022  Time:    12:06           Assessment/Plan:     * Displaced transverse fracture of left patella, initial encounter for open fracture type I or II  Brunilda Bass is a 76 y.o. female with PMH of HTN, hypothyroidism, and HPLD presenting with a left transverse patella fracture, grade one open, NVI. Given the large displacement of the fracture, the absence of an extensor mechanism this is an operative fracture. The presence of abrasions on the skin and the air on CT is concerning for open fracture, though on physical examination the wounds do no appear to extend deep and there is no exposure of the subdermal fat. The fracture will be treated as open regardless and Abx were given in the ED and she will be discharged on antibiotics until surgery. The patient has been informed of this as well as the risks, benefits, and alternatives to surgery and has elected to proceed with surgery. She will present to [facility] on 6/13/22 for operative fixation of the left patella.     - Consented for surgery in ED after discussion with attending surgeon  - Abx: ancef 2g, Vancomycin 1g, 1g cefepime in ED, will discharge on bactrim DS BID  - Labs: Hgb 11.4, Cr 1.5, A1c 5.7, all others pending  - NWB LLE in knee immobilizer              Enzo Bruce MD  Orthopedics  [facility]

## 2022-06-08 ENCOUNTER — TELEPHONE (OUTPATIENT)
Dept: ORTHOPEDICS | Facility: CLINIC | Age: 77
End: 2022-06-08
Payer: MEDICARE

## 2022-06-08 RX ORDER — METHOCARBAMOL 500 MG/1
500 TABLET, FILM COATED ORAL 3 TIMES DAILY
Qty: 42 TABLET | Refills: 0 | Status: ON HOLD | OUTPATIENT
Start: 2022-06-08 | End: 2022-06-13 | Stop reason: HOSPADM

## 2022-06-08 NOTE — TELEPHONE ENCOUNTER
----- Message from Lydia Smithfield sent at 6/8/2022  1:19 PM CDT -----  Regarding: daughter  .Type: Patient Call Back    Who called: lian Bella     What is the request in detail: states she needs to know what time to arrive for surgery on Monday please call     Can the clinic reply by [facility]? No     Would the patient rather a call back or a response via [facility]? Call      [phone]

## 2022-06-08 NOTE — TELEPHONE ENCOUNTER
Spoke with pt in regards to msg about upcoming surgery info, stated to pt I will be in touch on Friday with the correct arrival time for Monday 6/13/2022. Pt verbalize understands.

## 2022-06-09 ENCOUNTER — ANESTHESIA EVENT (OUTPATIENT)
Dept: SURGERY | Facility: HOSPITAL | Age: 77
End: 2022-06-09
Payer: MEDICARE

## 2022-06-10 ENCOUNTER — TELEPHONE (OUTPATIENT)
Dept: ORTHOPEDICS | Facility: CLINIC | Age: 77
End: 2022-06-10
Payer: MEDICARE

## 2022-06-10 RX ORDER — LORATADINE 10 MG/1
10 TABLET ORAL NIGHTLY PRN
COMMUNITY

## 2022-06-10 RX ORDER — CHLORTHALIDONE 25 MG/1
25 TABLET ORAL NIGHTLY
COMMUNITY

## 2022-06-10 RX ORDER — ASPIRIN 81 MG/1
81 TABLET ORAL 2 TIMES DAILY
Status: ON HOLD | COMMUNITY
End: 2022-07-25 | Stop reason: SDUPTHER

## 2022-06-10 NOTE — TELEPHONE ENCOUNTER
Spoke with pt's daughter in regards to surgery on Monday 06/13/2022, stated to pt's daughter no eatin/drinking after midnight, arrival time 7am, and she is to report to [address] [place] [address]. Pt's daughter verbalize understands.

## 2022-06-10 NOTE — PRE-PROCEDURE INSTRUCTIONS
PREOP INSTRUCTIONS:  No food,milk or milk products for 8 hours before surgery.  Clear liquids like water,gatorade,apple juice are allowed up until 2 hours before surgery.  Instructed to follow the surgeon's instructions if they differ from these.  Shower instructions as well as directions to the Surgery Center were given.  Encouraged to wear loose fitting,comfortable clothing.  Medication instructions for pm prior to and am of procedure reviewed.  Instructed to avoid taking vitamins,supplements,aspirin and ibuprofen the morning of surgery.    Patient denies any side effects or issues with anesthesia or sedation.

## 2022-06-11 NOTE — ANESTHESIA PREPROCEDURE EVALUATION
"[facility]  Anesthesia Pre-Operative Evaluation         Patient Name: Brunilda Bass  YOB: 1945  MRN: [id number]    SUBJECTIVE:     Pre-operative evaluation for Procedure(s) (LRB):  ORIF, FRACTURE, PATELLA (Left)     06/11/2022    Brunilda Bass is a 76 y.o. female w/ a significant PMHx of obesity, hypothyroid, htn. Presents w patellar fx.     Patient now presents for the above procedure(s).      LDA:        Peripheral IV - Single Lumen 06/07/22 1245 20 G Right Antecubital (Active)   Number of days: 4       Prev airway: None documented.    Drips: None documented.      Patient Active Problem List   Diagnosis    Essential hypertension    Acquired hypothyroidism    Pure hypercholesterolemia    Displaced transverse fracture of left patella, initial encounter for open fracture type I or II       Review of patient's allergies indicates:   Allergen Reactions    Aspirin      "chest burns"  Low dose aspirin ok    Lisinopril Other (See Comments)     Coughing       Current Inpatient Medications:      No current facility-administered medications on file prior to encounter.     Current Outpatient Medications on File Prior to Encounter   Medication Sig Dispense Refill    aspirin (ECOTRIN) 81 MG EC tablet Take 81 mg by mouth 2 (two) times a day.      atorvastatin (LIPITOR) 20 MG tablet Take by mouth every evening.      cetirizine (ZYRTEC) 10 MG tablet Take by mouth nightly as needed.      chlorthalidone (HYGROTEN) 25 MG Tab Take 25 mg by mouth nightly.      fluticasone propionate (FLONASE) 50 mcg/actuation nasal spray SHAKE LQ AND U 2 SPRAYS IEN QD UTD      levothyroxine (SYNTHROID) 50 MCG tablet Take by mouth before breakfast.      metoprolol succinate (TOPROL-XL) 50 MG 24 hr tablet Take by mouth every evening.      omeprazole (PRILOSEC) 20 MG capsule Take by mouth every morning.      sertraline (ZOLOFT) 50 MG tablet Take by mouth every evening.      sulfamethoxazole-trimethoprim " 800-160mg (BACTRIM DS) 800-160 mg Tab Take 1 tablet by mouth 2 (two) times daily. for 10 days 20 tablet 0    docusate sodium (COLACE) 100 MG capsule Take 1 capsule (100 mg total) by mouth 2 (two) times daily as needed for Constipation. Take this medication with prescription pain medicine 60 capsule 0    hydroCHLOROthiazide (HYDRODIURIL) 25 MG tablet       loratadine (CLARITIN) 10 mg tablet Take 10 mg by mouth nightly as needed for Allergies.      losartan (COZAAR) 25 MG tablet       ondansetron (ZOFRAN-ODT) 4 MG TbDL Take 1 tablet (4 mg total) by mouth every 8 (eight) hours as needed (nausea or vomiting). 12 tablet 0    oxyCODONE (ROXICODONE) 5 MG immediate release tablet Take 1 tablet (5 mg total) by mouth every 6 (six) hours as needed for Pain. 12 tablet 0       Past Surgical History:   Procedure Laterality Date    TONSILLECTOMY         Social History     Socioeconomic History    Marital status: Single   Tobacco Use    Smoking status: Never Smoker    Smokeless tobacco: Never Used   Substance and Sexual Activity    Alcohol use: Not Currently    Drug use: Never       OBJECTIVE:     Vital Signs Range (Last 24H):         Significant Labs:  Lab Results   Component Value Date    WBC 15.73 (H) 06/07/2022    HGB 11.4 (L) 06/07/2022    HCT 35.0 (L) 06/07/2022     06/07/2022    ALT 13 06/07/2022    AST 17 06/07/2022     (L) 06/07/2022    K 4.3 06/07/2022     06/07/2022    CREATININE 1.5 (H) 06/07/2022    BUN 18 06/07/2022    CO2 23 06/07/2022    INR 1.0 06/07/2022    HGBA1C 5.7 (H) 06/07/2022       Diagnostic Studies: No relevant studies.    EKG:   Results for orders placed or performed during the hospital encounter of 06/07/22   EKG 12-lead    Collection Time: 06/07/22  2:07 PM    Narrative    Test Reason : Z01.818,    Vent. Rate : 068 BPM     Atrial Rate : 068 BPM     P-R Int : 132 ms          QRS Dur : 108 ms      QT Int : 424 ms       P-R-T Axes : 099 038 011 degrees     QTc Int : 450  ms    Normal sinus rhythm  Incomplete right bundle branch block  Borderline Abnormal ECG  No previous ECGs available  Confirmed by Memo FLORES MD ([id number]) on 6/7/2022 2:57:22 PM    Referred By: MONTSE   SELF           Confirmed By:Memo FLORES MD       2D ECHO:  TTE:  No results found for this or any previous visit.    CORNELL:  No results found for this or any previous visit.    ASSESSMENT/PLAN:           Pre-op Assessment    I have reviewed the Patient Summary Reports.     I have reviewed the Nursing Notes. I have reviewed the NPO Status.   I have reviewed the Medications.     Review of Systems  Anesthesia Hx:  No problems with previous Anesthesia Denies Hx of Anesthetic complications  History of prior surgery of interest to airway management or planning: Denies Family Hx of Anesthesia complications.   Denies Personal Hx of Anesthesia complications.   Hematology/Oncology:  Hematology Normal   Oncology Normal     EENT/Dental:EENT/Dental Normal   Cardiovascular:   Hypertension no hyperlipidemia    Pulmonary:   Denies COPD.  Denies Sleep Apnea.    Renal/:  Renal/ Normal     Hepatic/GI:   Denies GERD.    Musculoskeletal:   Denies Arthritis.     Neurological:  Neurology Normal    Endocrine:   Hypothyroidism  Obesity / BMI > 30  Psych:  Psychiatric Normal           Physical Exam  General: Well nourished, Cooperative, Alert and Oriented    Airway:  Mallampati: III / II  Mouth Opening: Normal  TM Distance: Normal  Tongue: Normal  Neck ROM: Normal ROM    Dental:  Intact    Chest/Lungs:  Clear to auscultation, Normal Respiratory Rate    Heart:  Rate: Normal  Rhythm: Regular Rhythm  Sounds: Normal    Abdomen:  Normal, Soft, Nontender        Anesthesia Plan  Type of Anesthesia, risks & benefits discussed:    Anesthesia Type: Gen ETT, Gen Supraglottic Airway, Gen Natural Airway, CSE, Regional  Intra-op Monitoring Plan: Standard ASA Monitors  Post Op Pain Control Plan: multimodal analgesia, IV/PO Opioids PRN and peripheral nerve  block  Induction:  IV  Airway Plan: Direct and Video, Post-Induction  Informed Consent: Informed consent signed with the Patient and all parties understand the risks and agree with anesthesia plan.  All questions answered.   ASA Score: 2  Day of Surgery Review of History & Physical: H&P Update referred to the surgeon/provider.    Ready For Surgery From Anesthesia Perspective.     .

## 2022-06-13 ENCOUNTER — HOSPITAL ENCOUNTER (OUTPATIENT)
Facility: HOSPITAL | Age: 77
Discharge: HOME | End: 2022-06-13
Attending: ORTHOPAEDIC SURGERY | Admitting: ORTHOPAEDIC SURGERY
Payer: MEDICARE

## 2022-06-13 ENCOUNTER — ANESTHESIA (OUTPATIENT)
Dept: SURGERY | Facility: HOSPITAL | Age: 77
End: 2022-06-13
Payer: MEDICARE

## 2022-06-13 VITALS
SYSTOLIC BLOOD PRESSURE: 139 MMHG | BODY MASS INDEX: 33.38 KG/M2 | HEART RATE: 65 BPM | DIASTOLIC BLOOD PRESSURE: 64 MMHG | TEMPERATURE: 97 F | RESPIRATION RATE: 18 BRPM | OXYGEN SATURATION: 97 % | HEIGHT: 60 IN | WEIGHT: 170 LBS

## 2022-06-13 DIAGNOSIS — S82.002B TYPE I OR II OPEN FRACTURE OF LEFT PATELLA, UNSPECIFIED FRACTURE MORPHOLOGY, INITIAL ENCOUNTER: ICD-10-CM

## 2022-06-13 DIAGNOSIS — S82.002A LEFT PATELLA FRACTURE: ICD-10-CM

## 2022-06-13 DIAGNOSIS — S82.032B: Primary | ICD-10-CM

## 2022-06-13 PROCEDURE — 37000008 HC ANESTHESIA 1ST 15 MINUTES: Performed by: ORTHOPAEDIC SURGERY

## 2022-06-13 PROCEDURE — 63600175 PHARM REV CODE 636 W HCPCS: Performed by: SURGERY

## 2022-06-13 PROCEDURE — 11012 DEB SKIN BONE AT FX SITE: CPT | Mod: 51,GC,, | Performed by: ORTHOPAEDIC SURGERY

## 2022-06-13 PROCEDURE — 64448 NJX AA&/STRD FEM NRV NFS IMG: CPT | Performed by: STUDENT IN AN ORGANIZED HEALTH CARE EDUCATION/TRAINING PROGRAM

## 2022-06-13 PROCEDURE — 11012 PR DEBRIDE ASSOC OPEN FX/DISLO SKIN/MUS/BONE: ICD-10-PCS | Mod: 51,GC,, | Performed by: ORTHOPAEDIC SURGERY

## 2022-06-13 PROCEDURE — C1769 GUIDE WIRE: HCPCS | Performed by: ORTHOPAEDIC SURGERY

## 2022-06-13 PROCEDURE — 64448 NJX AA&/STRD FEM NRV NFS IMG: CPT | Mod: 59,LT,, | Performed by: ANESTHESIOLOGY

## 2022-06-13 PROCEDURE — 76942 ECHO GUIDE FOR BIOPSY: CPT | Mod: 26,,, | Performed by: ANESTHESIOLOGY

## 2022-06-13 PROCEDURE — 63600175 PHARM REV CODE 636 W HCPCS: Performed by: ANESTHESIOLOGY

## 2022-06-13 PROCEDURE — 63600175 PHARM REV CODE 636 W HCPCS: Performed by: STUDENT IN AN ORGANIZED HEALTH CARE EDUCATION/TRAINING PROGRAM

## 2022-06-13 PROCEDURE — 64448 PERIPHERAL BLOCK: ICD-10-PCS | Mod: 59,LT,, | Performed by: ANESTHESIOLOGY

## 2022-06-13 PROCEDURE — 27524 TREAT KNEECAP FRACTURE: CPT | Mod: LT,GC,, | Performed by: ORTHOPAEDIC SURGERY

## 2022-06-13 PROCEDURE — 25000003 PHARM REV CODE 250: Performed by: STUDENT IN AN ORGANIZED HEALTH CARE EDUCATION/TRAINING PROGRAM

## 2022-06-13 PROCEDURE — D9220A PRA ANESTHESIA: ICD-10-PCS | Mod: ,,, | Performed by: ANESTHESIOLOGY

## 2022-06-13 PROCEDURE — 76942 PERIPHERAL BLOCK: ICD-10-PCS | Mod: 26,,, | Performed by: ANESTHESIOLOGY

## 2022-06-13 PROCEDURE — 27201423 OPTIME MED/SURG SUP & DEVICES STERILE SUPPLY: Performed by: ORTHOPAEDIC SURGERY

## 2022-06-13 PROCEDURE — 63600175 PHARM REV CODE 636 W HCPCS: Performed by: ORTHOPAEDIC SURGERY

## 2022-06-13 PROCEDURE — 36000710: Performed by: ORTHOPAEDIC SURGERY

## 2022-06-13 PROCEDURE — 27524 PR OPEN RX PATELLA FX: ICD-10-PCS | Mod: LT,GC,, | Performed by: ORTHOPAEDIC SURGERY

## 2022-06-13 PROCEDURE — D9220A PRA ANESTHESIA: Mod: ,,, | Performed by: ANESTHESIOLOGY

## 2022-06-13 PROCEDURE — 37000009 HC ANESTHESIA EA ADD 15 MINS: Performed by: ORTHOPAEDIC SURGERY

## 2022-06-13 PROCEDURE — C1713 ANCHOR/SCREW BN/BN,TIS/BN: HCPCS | Performed by: ORTHOPAEDIC SURGERY

## 2022-06-13 PROCEDURE — 71000015 HC POSTOP RECOV 1ST HR: Performed by: ORTHOPAEDIC SURGERY

## 2022-06-13 PROCEDURE — 36000711: Performed by: ORTHOPAEDIC SURGERY

## 2022-06-13 PROCEDURE — 25000003 PHARM REV CODE 250: Performed by: ORTHOPAEDIC SURGERY

## 2022-06-13 PROCEDURE — 71000044 HC DOSC ROUTINE RECOVERY FIRST HOUR: Performed by: ORTHOPAEDIC SURGERY

## 2022-06-13 DEVICE — IMPLANTABLE DEVICE: Type: IMPLANTABLE DEVICE | Site: KNEE | Status: FUNCTIONAL

## 2022-06-13 DEVICE — CABLE 1.0 W/CRIMP: Type: IMPLANTABLE DEVICE | Site: KNEE | Status: FUNCTIONAL

## 2022-06-13 RX ORDER — MIDAZOLAM HYDROCHLORIDE 1 MG/ML
.5-4 INJECTION INTRAMUSCULAR; INTRAVENOUS
Status: DISCONTINUED | OUTPATIENT
Start: 2022-06-13 | End: 2022-06-13 | Stop reason: HOSPADM

## 2022-06-13 RX ORDER — SODIUM CHLORIDE 0.9 % (FLUSH) 0.9 %
10 SYRINGE (ML) INJECTION
Status: DISCONTINUED | OUTPATIENT
Start: 2022-06-13 | End: 2022-06-13 | Stop reason: HOSPADM

## 2022-06-13 RX ORDER — PHENYLEPHRINE HYDROCHLORIDE 10 MG/ML
INJECTION INTRAVENOUS
Status: DISCONTINUED | OUTPATIENT
Start: 2022-06-13 | End: 2022-06-13

## 2022-06-13 RX ORDER — ROPIVACAINE HYDROCHLORIDE 2 MG/ML
0.1 INJECTION, SOLUTION EPIDURAL; INFILTRATION; PERINEURAL CONTINUOUS
Status: DISCONTINUED | OUTPATIENT
Start: 2022-06-13 | End: 2022-06-13 | Stop reason: HOSPADM

## 2022-06-13 RX ORDER — DEXAMETHASONE SODIUM PHOSPHATE 4 MG/ML
INJECTION, SOLUTION INTRA-ARTICULAR; INTRALESIONAL; INTRAMUSCULAR; INTRAVENOUS; SOFT TISSUE
Status: DISCONTINUED | OUTPATIENT
Start: 2022-06-13 | End: 2022-06-13

## 2022-06-13 RX ORDER — LIDOCAINE HYDROCHLORIDE 20 MG/ML
INJECTION, SOLUTION EPIDURAL; INFILTRATION; INTRACAUDAL; PERINEURAL
Status: DISCONTINUED | OUTPATIENT
Start: 2022-06-13 | End: 2022-06-13

## 2022-06-13 RX ORDER — ONDANSETRON 2 MG/ML
4 INJECTION INTRAMUSCULAR; INTRAVENOUS EVERY 12 HOURS PRN
Status: DISCONTINUED | OUTPATIENT
Start: 2022-06-13 | End: 2022-06-13 | Stop reason: HOSPADM

## 2022-06-13 RX ORDER — OXYCODONE HYDROCHLORIDE 5 MG/1
5 TABLET ORAL EVERY 6 HOURS PRN
Qty: 20 TABLET | Refills: 0 | Status: ON HOLD | OUTPATIENT
Start: 2022-06-13 | End: 2022-07-25 | Stop reason: SDUPTHER

## 2022-06-13 RX ORDER — ROPIVACAINE HYDROCHLORIDE 2 MG/ML
INJECTION, SOLUTION EPIDURAL; INFILTRATION; PERINEURAL CONTINUOUS
Status: DISCONTINUED | OUTPATIENT
Start: 2022-06-13 | End: 2022-06-13 | Stop reason: HOSPADM

## 2022-06-13 RX ORDER — NEOSTIGMINE METHYLSULFATE 0.5 MG/ML
INJECTION, SOLUTION INTRAVENOUS
Status: DISCONTINUED | OUTPATIENT
Start: 2022-06-13 | End: 2022-06-13

## 2022-06-13 RX ORDER — DEXTROMETHORPHAN HYDROBROMIDE, GUAIFENESIN 5; 100 MG/5ML; MG/5ML
650 LIQUID ORAL EVERY 8 HOURS
Qty: 90 TABLET | Refills: 0 | Status: ON HOLD | OUTPATIENT
Start: 2022-06-13 | End: 2022-07-25 | Stop reason: SDUPTHER

## 2022-06-13 RX ORDER — VANCOMYCIN HCL IN 5 % DEXTROSE 1G/250ML
1000 PLASTIC BAG, INJECTION (ML) INTRAVENOUS
Status: COMPLETED | OUTPATIENT
Start: 2022-06-13 | End: 2022-06-13

## 2022-06-13 RX ORDER — ACETAMINOPHEN 500 MG
1000 TABLET ORAL
Status: COMPLETED | OUTPATIENT
Start: 2022-06-13 | End: 2022-06-13

## 2022-06-13 RX ORDER — CEFAZOLIN SODIUM/D5W 2 G/100 ML
2 PLASTIC BAG, INJECTION (ML) INTRAVENOUS
Status: COMPLETED | OUTPATIENT
Start: 2022-06-13 | End: 2022-06-13

## 2022-06-13 RX ORDER — ROPIVACAINE HYDROCHLORIDE 5 MG/ML
INJECTION, SOLUTION EPIDURAL; INFILTRATION; PERINEURAL
Status: COMPLETED | OUTPATIENT
Start: 2022-06-13 | End: 2022-06-13

## 2022-06-13 RX ORDER — FENTANYL CITRATE 50 UG/ML
25-200 INJECTION, SOLUTION INTRAMUSCULAR; INTRAVENOUS
Status: DISCONTINUED | OUTPATIENT
Start: 2022-06-13 | End: 2022-06-13 | Stop reason: HOSPADM

## 2022-06-13 RX ORDER — ROPIVACAINE HYDROCHLORIDE 2 MG/ML
INJECTION, SOLUTION EPIDURAL; INFILTRATION; PERINEURAL
Status: DISCONTINUED
Start: 2022-06-13 | End: 2022-06-13 | Stop reason: HOSPADM

## 2022-06-13 RX ORDER — HALOPERIDOL 5 MG/ML
0.5 INJECTION INTRAMUSCULAR EVERY 10 MIN PRN
Status: DISCONTINUED | OUTPATIENT
Start: 2022-06-13 | End: 2022-06-13 | Stop reason: HOSPADM

## 2022-06-13 RX ORDER — METHOCARBAMOL 750 MG/1
750 TABLET, FILM COATED ORAL 3 TIMES DAILY
Qty: 30 TABLET | Refills: 0 | Status: SHIPPED | OUTPATIENT
Start: 2022-06-13 | End: 2022-06-23

## 2022-06-13 RX ORDER — VANCOMYCIN HYDROCHLORIDE 1 G/20ML
INJECTION, POWDER, LYOPHILIZED, FOR SOLUTION INTRAVENOUS
Status: DISCONTINUED | OUTPATIENT
Start: 2022-06-13 | End: 2022-06-13 | Stop reason: HOSPADM

## 2022-06-13 RX ORDER — HYDROMORPHONE HYDROCHLORIDE 1 MG/ML
0.2 INJECTION, SOLUTION INTRAMUSCULAR; INTRAVENOUS; SUBCUTANEOUS
Status: DISCONTINUED | OUTPATIENT
Start: 2022-06-13 | End: 2022-06-13 | Stop reason: HOSPADM

## 2022-06-13 RX ORDER — SODIUM CHLORIDE 9 MG/ML
INJECTION, SOLUTION INTRAVENOUS CONTINUOUS
Status: DISCONTINUED | OUTPATIENT
Start: 2022-06-13 | End: 2022-06-13 | Stop reason: HOSPADM

## 2022-06-13 RX ORDER — PROPOFOL 10 MG/ML
VIAL (ML) INTRAVENOUS
Status: DISCONTINUED | OUTPATIENT
Start: 2022-06-13 | End: 2022-06-13

## 2022-06-13 RX ORDER — MUPIROCIN 20 MG/G
OINTMENT TOPICAL
Status: DISCONTINUED | OUTPATIENT
Start: 2022-06-13 | End: 2022-06-13 | Stop reason: HOSPADM

## 2022-06-13 RX ORDER — ROCURONIUM BROMIDE 10 MG/ML
INJECTION, SOLUTION INTRAVENOUS
Status: DISCONTINUED | OUTPATIENT
Start: 2022-06-13 | End: 2022-06-13

## 2022-06-13 RX ADMIN — PHENYLEPHRINE HYDROCHLORIDE 100 MCG: 10 INJECTION INTRAVENOUS at 10:06

## 2022-06-13 RX ADMIN — MUPIROCIN: 20 OINTMENT TOPICAL at 09:06

## 2022-06-13 RX ADMIN — VANCOMYCIN HYDROCHLORIDE 1000 MG: 1 INJECTION, POWDER, LYOPHILIZED, FOR SOLUTION INTRAVENOUS at 09:06

## 2022-06-13 RX ADMIN — DEXAMETHASONE SODIUM PHOSPHATE 8 MG: 4 INJECTION, SOLUTION INTRAMUSCULAR; INTRAVENOUS at 10:06

## 2022-06-13 RX ADMIN — ACETAMINOPHEN 1000 MG: 500 TABLET ORAL at 08:06

## 2022-06-13 RX ADMIN — SODIUM CHLORIDE: 0.9 INJECTION, SOLUTION INTRAVENOUS at 09:06

## 2022-06-13 RX ADMIN — FENTANYL CITRATE 50 MCG: 50 INJECTION INTRAMUSCULAR; INTRAVENOUS at 08:06

## 2022-06-13 RX ADMIN — LIDOCAINE HYDROCHLORIDE 75 MG: 20 INJECTION, SOLUTION EPIDURAL; INFILTRATION; INTRACAUDAL; PERINEURAL at 09:06

## 2022-06-13 RX ADMIN — MIDAZOLAM 1 MG: 1 INJECTION INTRAMUSCULAR; INTRAVENOUS at 08:06

## 2022-06-13 RX ADMIN — ROPIVACAINE HYDROCHLORIDE 10 ML: 5 INJECTION, SOLUTION EPIDURAL; INFILTRATION; PERINEURAL at 09:06

## 2022-06-13 RX ADMIN — NEOSTIGMINE METHYLSULFATE 3.5 MG: 0.5 INJECTION INTRAVENOUS at 11:06

## 2022-06-13 RX ADMIN — PHENYLEPHRINE HYDROCHLORIDE 100 MCG: 10 INJECTION INTRAVENOUS at 11:06

## 2022-06-13 RX ADMIN — VANCOMYCIN HYDROCHLORIDE 1000 MG: 1 INJECTION, POWDER, LYOPHILIZED, FOR SOLUTION INTRAVENOUS at 08:06

## 2022-06-13 RX ADMIN — Medication 2 G: at 09:06

## 2022-06-13 RX ADMIN — ROCURONIUM BROMIDE 30 MG: 10 INJECTION, SOLUTION INTRAVENOUS at 09:06

## 2022-06-13 RX ADMIN — PROPOFOL 30 MG: 10 INJECTION, EMULSION INTRAVENOUS at 11:06

## 2022-06-13 RX ADMIN — PROPOFOL 90 MG: 10 INJECTION, EMULSION INTRAVENOUS at 09:06

## 2022-06-13 RX ADMIN — GLYCOPYRROLATE 0.6 MG: 0.2 INJECTION, SOLUTION INTRAMUSCULAR; INTRAVITREAL at 11:06

## 2022-06-13 RX ADMIN — PHENYLEPHRINE HYDROCHLORIDE 100 MCG: 10 INJECTION INTRAVENOUS at 09:06

## 2022-06-13 NOTE — TRANSFER OF CARE
Anesthesia Transfer of Care Note    Patient: Brunilda Bass    Procedure(s) Performed: Procedure(s) (LRB):  ORIF, FRACTURE, PATELLA (Left)    Patient location: [facility]    Anesthesia Type: general    Transport from OR: Transported from OR on room air with adequate spontaneous ventilation    Post pain: adequate analgesia    Post assessment: no apparent anesthetic complications    Post vital signs: stable    Level of consciousness: awake    Nausea/Vomiting: no nausea/vomiting    Complications: none    Transfer of care protocol was followed      Last vitals:   Visit Vitals  BP (!) 112/51 (BP Location: Right arm, Patient Position: Lying)   Pulse 66   Temp 36 °C (96.8 °F) (Temporal)   Resp 14   Ht 5' (1.524 m)   Wt 77.1 kg (170 lb)   SpO2 96%   Breastfeeding No   BMI 33.20 kg/m²

## 2022-06-13 NOTE — OP NOTE
OP NOTE    DOS:  06/13/2022    Preop Dx: Grade 1 open Left patella fracture    Postop Dx: Grade 1 open Left patella fracture    Procedure: Open reduction internal fixation of left patella fracture    Excisional and nonexcisional Debridement irrigation open left patella fracture, subcutaneous tissue and bone     Surgeon: Al Anderson M.D.    Asst:  Enzo Bruce M.D    Anesthesia: GETA    EBL:  20cc    IVF:  1500cc crystalloid    Implants: Synthes 4.0 mm cannulated screws x2 with 1 mm cable.    Specimens: None    Findings: Good fixation.  Good retinacular repair.  Washed out copiously.    Dispo:  To PACU extubated/stable       Indications for Procedure:      Patient is a 76-year-old female fell directly onto her left knee sustaining a left patella fracture.  She has a fairly solid superior pole fragment and inferior pole fragment with a split.  I am taking her to the operating room for open reduction internal fixation.  She had several small areas of air on her original CT scan and a small abrasion on the anterior portion of her knee.  I was unsure if this was VAC affect as the abrasion appears very superficial.  She was, however given antibiotics IV in the emergency department and kept on Bactrim since that point time.  We are now returning to the operating room for fixation.  The risks, benefits and alternatives to surgery discussed the patient at length prior to going to the operating room.  Informed consent was obtained.    Procedure in Detail:    Patient was identified the preop holding area and the site was marked.  Regional analgesia was administered.  Patient was wheeled to the operating room and placed on the operating table in supine position.  General endotracheal anesthesia induced and preoperative antibiotics were administered to include vancomycin and Ancef.  The left lower extremities placed into a nonsterile tourniquet and then prepped draped sterile fashion.  A time-out was undertaken to confirm  patient, side, site, surgery, surgeon and administration of preoperative antibiotics.  All agreed we proceeded.  The leg was exsanguinated and the tourniquet was raised.    I made an anterior incision through the skin subcutaneous tissues.  I developed full-thickness flaps medially and laterally.  Immediately was in the knee joint and suctioned out some fluid.  Patient had loose and pedunculated subcutaneous tissue which I excised with a 10 blade about 5-10 sq cm.  I inspected the undersurface of the flap with the abrasion and could not find any communication at this point.    After suctioning all the fluid removing any hematoma from the knee joint I did a cursory irrigation with normal saline solution.  I then removed several small bony fragments with a 10 blade.  I then curetted the ends of the fracture to remove any loose bone.  At this point I irrigated copiously with normal saline solution.  I then allowed a 17/500 mL Betadine bath to sit for a few minutes and then again irrigated copiously with normal saline solution.     I 1st began by clamping the undersurface of the inferior pole to the superior surface and put a K-wire from anterior to posterior to hold this in position.  I then clamped the superior and inferior poles together using several smaller clamps and then 1 large clamp in the center.  I then placed guidewires for the 4.0 mm cannulated screws from a convergent point in the inferior pole where I had good bone going through the intercalary fragment and then toward the superior pole.  I measured and drilled these and placed 2 screws getting very good purchase in the more lateral screw and fairly good purchase the more medial screw.  I then removed the K-wire from anterior posterior.  I then passed a 1 mm cable in a figure-of-eight fashion and tensioned this.  I allowed it to sit for several minutes to make sure there was no more soft tissue that needed to subside.  I then retention the cable, crimped  and cut it.  I flex the knee to about 45° and had very good fixation with no gapping.    At this point I repaired the retinacular tears with 0 Vicryl suture in interrupted figure-of-eight fashion.  The tourniquet was let down and hemostasis was obtained with electrocautery.  I then again thoroughly irrigated with normal saline solution.  I placed 1 g vancomycin and 2 g cefepime powder deep.  I closed the fascia with 0 Vicryl suture, subcutaneous tissue with a combination of 2-0 3-0 Vicryl suture and the skin with 3-0 nylon suture in running fashion.  A sterile silver lined dressing was then placed.  Patient was then placed into a hinged knee brace locked in extension.    All instrument and sponge counts were reported correct in the case.  There were no complications.  The patient was extubated, awakened and taken to recovery room stable condition.    Plan the patient:    She will stay straight in the hinged knee brace for the 1st 4 weeks and will begin progressive range of motion.  She may weight bear as tolerated in the hinged knee brace locked in extension.  81 mg aspirin twice a day for least 1 month.  She will finish out the Bactrim that she was previously prescribed.    Al Anderson MD

## 2022-06-13 NOTE — INTERVAL H&P NOTE
No signficant change to H&P.  Possible grade 1 open comminuted left patella fracture vs vacuum effect.  Abx in ED and since.  To OR for ORIF.  The risks, benefits and alternatives to surgery were discussed with the patient at great length.  These include bleeding, infection, vessel/nerve damage, pain, numbness, tingling, stiffness, complex regional pain syndrome, hardware/surgical failure, need for further surgery, malunion, nonunion, DVT, PE, arthritis and death.  Patient states an understanding and wishes to proceed with surgery.   All questions were answered.  No guarantees were implied or stated.  Informed consent was obtained.        Active Hospital Problems    Diagnosis  POA    *Displaced transverse fracture of left patella, initial encounter for open fracture type I or II [D80.212H]  Yes      Resolved Hospital Problems   No resolved problems to display.

## 2022-06-13 NOTE — BRIEF OP NOTE
[facility] - [facility] (2nd Fl)  Brief Operative Note    Surgery Date: 6/13/2022     Surgeon(s) and Role:     * Al Anderson MD - Primary     * Enzo Bruce MD - Resident - Assisting        Pre-op Diagnosis:  Type I or II open fracture of left patella, unspecified fracture morphology, initial encounter [S82.002B]    Post-op Diagnosis:  Post-Op Diagnosis Codes:     * Type I or II open fracture of left patella, unspecified fracture morphology, initial encounter [S82.002B]    Procedure(s) (LRB):  ORIF, FRACTURE, PATELLA (Left)    Anesthesia: Choice    Operative Findings: See full op note    Estimated Blood Loss: Minimal    Specimens:   Specimen (24h ago, onward)            None            Discharge Note    OUTCOME: Patient tolerated treatment/procedure well without complication and is now ready for discharge.    DISPOSITION: Home or Self Care    FINAL DIAGNOSIS:  Displaced transverse fracture of left patella, initial encounter for open fracture type I or II    FOLLOWUP: In clinic    DISCHARGE INSTRUCTIONS:    Discharge Procedure Orders   Notify your health care provider if you experience any of the following:  temperature >100.4     Notify your health care provider if you experience any of the following:  persistent nausea and vomiting or diarrhea     Notify your health care provider if you experience any of the following:  severe uncontrolled pain     Notify your health care provider if you experience any of the following:  redness, tenderness, or signs of infection (pain, swelling, redness, odor or green/yellow discharge around incision site)     Notify your health care provider if you experience any of the following:  difficulty breathing or increased cough     Notify your health care provider if you experience any of the following:  severe persistent headache     Notify your health care provider if you experience any of the following:  worsening rash     Notify your health care provider if you experience any of  the following:  persistent dizziness, light-headedness, or visual disturbances     Notify your health care provider if you experience any of the following:  increased confusion or weakness     Weight bearing restrictions (specify):   Order Comments: HKB locked in extension

## 2022-06-13 NOTE — PLAN OF CARE
"[facility] - [facility] ([facility])      HOME HEALTH ORDERS  FACE TO FACE ENCOUNTER    Patient Name: Brunilda Bass  YOB: 1945    PCP: NORMA lKein   PCP Address: [address] [address]  PCP Phone Number: [phone]  PCP Fax: [phone]    Encounter Date: 6/7/22    Admit to Home Health    Diagnoses:  Active Hospital Problems    Diagnosis  POA    *Displaced transverse fracture of left patella, initial encounter for open fracture type I or II [S82.032B]  Yes      Resolved Hospital Problems   No resolved problems to display.       Follow Up Appointments:  Future Appointments   Date Time Provider Department Center   6/27/2022 11:00 AM [facility]   7/25/2022 10:15 AM [facility]       Allergies:  Review of patient's allergies indicates:   Allergen Reactions    Aspirin      "chest burns"  Low dose aspirin ok    Lisinopril Other (See Comments)     Coughing       Medications: Review discharge medications with patient and family and provide education.    Current Facility-Administered Medications   Medication Dose Route Frequency Provider Last Rate Last Admin    0.9%  NaCl infusion   Intravenous Continuous Al Anderson MD   Stopped at 06/13/22 1226    fentaNYL 50 mcg/mL injection  mcg   mcg Intravenous PRN Chaitanya Peres MD   50 mcg at 06/13/22 0853    haloperidol lactate injection 0.5 mg  0.5 mg Intravenous Q10 Min PRN Alejandro Guzmán MD        HYDROmorphone injection 0.2 mg  0.2 mg Intravenous Q15 Min PRN Alejandro Guzmán MD        midazolam (VERSED) 1 mg/mL injection 0.5-4 mg  0.5-4 mg Intravenous PRN Chaitanya Peres MD   1 mg at 06/13/22 0853    mupirocin 2 % ointment   Nasal On Call Procedure Al Anderson MD   Given at 06/13/22 0904    ondansetron injection 4 mg  4 mg Intravenous Q12H PRN Chris Garcia MD        ROPIvacaine (PF) 2 mg/ml (0.2%) solution  0.1 mL/hr Perineural " Continuous Chris Garcia MD        sodium chloride 0.9% flush 10 mL  10 mL Intravenous PRN Alejandro Guzmán MD         Current Discharge Medication List      START taking these medications    Details   acetaminophen (TYLENOL) 650 MG TbSR Take 1 tablet (650 mg total) by mouth every 8 (eight) hours.  Qty: 90 tablet, Refills: 0    Comments: Please deliver to bedside before discharge.         CONTINUE these medications which have CHANGED    Details   methocarbamoL (ROBAXIN) 750 MG Tab Take 1 tablet (750 mg total) by mouth 3 (three) times daily. for 10 days  Qty: 30 tablet, Refills: 0    Comments: Please deliver to bedside before discharge.      oxyCODONE (ROXICODONE) 5 MG immediate release tablet Take 1 tablet (5 mg total) by mouth every 6 (six) hours as needed for Pain. May take 1-2 tablets every 6 hours as needed for pain  Qty: 20 tablet, Refills: 0    Comments: Quantity prescribed more than 7 day supply? No. Please deliver to bedside before discharge.         CONTINUE these medications which have NOT CHANGED    Details   aspirin (ECOTRIN) 81 MG EC tablet Take 81 mg by mouth 2 (two) times a day.      atorvastatin (LIPITOR) 20 MG tablet Take by mouth every evening.      cetirizine (ZYRTEC) 10 MG tablet Take by mouth nightly as needed.      chlorthalidone (HYGROTEN) 25 MG Tab Take 25 mg by mouth nightly.      fluticasone propionate (FLONASE) 50 mcg/actuation nasal spray SHAKE LQ AND U 2 SPRAYS IEN QD UTD      levothyroxine (SYNTHROID) 50 MCG tablet Take by mouth before breakfast.      loratadine (CLARITIN) 10 mg tablet Take 10 mg by mouth nightly as needed for Allergies.      metoprolol succinate (TOPROL-XL) 50 MG 24 hr tablet Take by mouth every evening.    Comments: .      omeprazole (PRILOSEC) 20 MG capsule Take by mouth every morning.      sertraline (ZOLOFT) 50 MG tablet Take by mouth every evening.      sulfamethoxazole-trimethoprim 800-160mg (BACTRIM DS) 800-160 mg Tab Take 1 tablet by mouth 2 (two)  times daily. for 10 days  Qty: 20 tablet, Refills: 0      docusate sodium (COLACE) 100 MG capsule Take 1 capsule (100 mg total) by mouth 2 (two) times daily as needed for Constipation. Take this medication with prescription pain medicine  Qty: 60 capsule, Refills: 0      ondansetron (ZOFRAN-ODT) 4 MG TbDL Take 1 tablet (4 mg total) by mouth every 8 (eight) hours as needed (nausea or vomiting).  Qty: 12 tablet, Refills: 0               I have seen and examined this patient within the last 30 days. My clinical findings that support the need for the home health skilled services and home bound status are the following:no   Weakness/numbness causing balance and gait disturbance due to Surgery making it taxing to leave home.     Diet:   regular diet    Labs:  Report Lab results to PCP.    Referrals/ Consults  Physical Therapy to evaluate and treat. Evaluate for home safety and equipment needs; Establish/upgrade home exercise program. Perform / instruct on therapeutic exercises, gait training, transfer training, and Range of Motion.  Occupational Therapy to evaluate and treat. Evaluate home environment for safety and equipment needs. Perform/Instruct on transfers, ADL training, ROM, and therapeutic exercises.    Activities:   other WBAT with walker with HKB locked in extension    Nursing:   Agency to admit patient within 24 hours of hospital discharge unless specified on physician order or at patient request    SN to complete comprehensive assessment including routine vital signs. Instruct on disease process and s/s of complications to report to MD. Review/verify medication list sent home with the patient at time of discharge  and instruct patient/caregiver as needed. Frequency may be adjusted depending on start of care date.     Skilled nurse to perform up to 3 visits PRN for symptoms related to diagnosis    Notify MD if SBP > 160 or < 90; DBP > 90 or < 50; HR > 120 or < 50; Temp > 101; O2 < 88%;     Ok to schedule  additional visits based on staff availability and patient request on consecutive days within the home health episode.    When multiple disciplines ordered:    Start of Care occurs on Sunday - Wednesday schedule remaining discipline evaluations as ordered on separate consecutive days following the start of care.    Thursday SOC -schedule subsequent evaluations Friday and Monday the following week.     Friday - Saturday SOC - schedule subsequent discipline evaluations on consecutive days starting Monday of the following week.    For all post-discharge communication and subsequent orders please contact patient's primary care physician. If unable to reach primary care physician or do not receive response within 30 minutes, please contact Rachel Ruano or orthopedic surgery on call  for clinical staff order clarification    Miscellaneous   Routine Skin for Bedridden Patients: Instruct patient/caregiver to apply moisture barrier cream to all skin folds and wet areas in perineal area daily and after baths and all bowel movements.    Home Health Aide:  Physical Therapy Daily and Occupational Therapy Daily    Wound Care Orders  no    I certify that this patient is confined to her home and needs physical therapy and occupational therapy.

## 2022-06-13 NOTE — ANESTHESIA PROCEDURE NOTES
Peripheral Block    Patient location during procedure: pre-op   Block not for primary anesthetic.  Reason for block: at surgeon's request and post-op pain management   Post-op Pain Location: L knee pain   Start time: 6/13/2022 8:41 AM  Timeout: 6/13/2022 8:40 AM   End time: 6/13/2022 8:50 AM    Staffing  Authorizing Provider: Alli Carpio MD  Performing Provider: Chris Garcia MD    Preanesthetic Checklist  Completed: patient identified, IV checked, site marked, risks and benefits discussed, surgical consent, monitors and equipment checked, pre-op evaluation and timeout performed  Peripheral Block  Patient position: supine  Prep: ChloraPrep and site prepped and draped  Patient monitoring: heart rate, cardiac monitor, continuous pulse ox, continuous capnometry and frequent blood pressure checks  Block type: femoral  Laterality: left  Injection technique: continuous  Needle  Needle type: Tuohy   Needle gauge: 17 G  Needle length: 3.5 in  Needle localization: anatomical landmarks and ultrasound guidance  Catheter type: spring wound  Catheter size: 19 G  Catheter at skin depth: 8 cm  Test dose: lidocaine 1.5% with Epi 1-to-200,000 and negative   -ultrasound image captured on disc.  Assessment  Injection assessment: negative aspiration, negative parasthesia and local visualized surrounding nerve  Paresthesia pain: none  Heart rate change: no  Slow fractionated injection: yes    Medications:    Medications: ropivacaine (NAROPIN) injection 0.5% - Perineural   10 mL - 6/13/2022 9:00:00 AM    Additional Notes  VSS.  [facility] RN monitoring vitals throughout procedure.  Patient tolerated procedure well.     20cc 0.25 ropi

## 2022-06-13 NOTE — PROGRESS NOTES
Pt and family present, consent to converting femoral PNC infusion to Nimbus.  Site CDI.  Educated regarding continued pain management, fall risk, signs of complications, continued monitoring, as well as discontinuing catheter on 6/18.  Two numbers obtained for APS to follow up.  Understanding verbalized.

## 2022-06-13 NOTE — PLAN OF CARE
Patient states ready for discharge. Pain assessed and acceptable at this time. No complaints of nausea. Discharge instructions reviewed.

## 2022-06-13 NOTE — PATIENT INSTRUCTIONS
You may take Tylenol and Motrin in addition to your prescribed pain medication UNLESS you were prescribed Norco or Percocet.    Norco and Percocet contain tylenol, do not take tylenol with these medications if you were prescribed them.     Keep your knee brace locked in extension (leg stays straight).    Leave your dressing on until your follow up appointment.     Call us if you experience: fever, chills, chest pain, shortness of breath, severe headache, pain not relieved by oral medications, increased pain and swelling at the operative site, or any other questions or concerns. We are happy to assist you at any time.

## 2022-06-13 NOTE — ANESTHESIA PROCEDURE NOTES
Intubation    Date/Time: 6/13/2022 9:48 AM  Performed by: Ammon Velasquez MD  Authorized by: Bandar Gambino MD     Intubation:     Induction:  Intravenous    Intubated:  Postinduction    Mask Ventilation:  Easy mask    Attempts:  2    Attempted By:  Resident anesthesiologist    Method of Intubation:  Direct    Blade:  Yung 2    Laryngeal View Grade: Grade IIb - only the arytenoids and epiglottis seen      Attempted By (2nd Attempt):  Staff anesthesiologist    Method of Intubation (2nd Attempt):  Direct    Blade (2nd Attempt):  Yung 2    Laryngeal View Grade (2nd Attempt): Grade I - full view of cords      Difficult Airway Encountered?: No      Complications:  None    Airway Device:  Oral endotracheal tube    Airway Device Size:  7.0    Style/Cuff Inflation:  Cuffed (inflated to minimal occlusive pressure)    Inflation Amount (mL):  6    Tube secured:  21    Secured at:  The lips    Placement Verified By:  Capnometry    Complicating Factors:  None    Findings Post-Intubation:  BS equal bilateral and atraumatic/condition of teeth unchanged

## 2022-06-13 NOTE — PLAN OF CARE
Case  was notified by MD that the patient needed Home health arranged. Case  called the patient's cell phone to get a patient choice for Home health. The patient did not answer. A referral was sent to [facility] and they accepted the patient. [facility] will contact the patient to start services on 6.14.22. Case  called the patient and left a voicemail with the Home Health information. Additionally the [facility] information was added to the patient's AVS/discharge paperwork.

## 2022-06-14 ENCOUNTER — TELEPHONE (OUTPATIENT)
Dept: ORTHOPEDICS | Facility: CLINIC | Age: 77
End: 2022-06-14
Payer: MEDICARE

## 2022-06-14 NOTE — TELEPHONE ENCOUNTER
Spoke with pt. Jury duty letter written. Patient states verbal understanding and has no further questions.

## 2022-06-14 NOTE — ANESTHESIA POSTPROCEDURE EVALUATION
Anesthesia Post Evaluation    Patient: Brunilda Bass    Procedure(s) Performed: Procedure(s) (LRB):  ORIF, FRACTURE, PATELLA (Left)    Final Anesthesia Type: general      Patient location during evaluation: PACU  Patient participation: Yes- Able to Participate  Level of consciousness: awake and alert and oriented  Post-procedure vital signs: reviewed and stable  Pain management: adequate  Airway patency: patent    PONV status at discharge: No PONV  Anesthetic complications: no      Cardiovascular status: hemodynamically stable  Respiratory status: unassisted, spontaneous ventilation and room air  Hydration status: euvolemic  Follow-up not needed.          Vitals Value Taken Time   /67 06/13/22 1317   Temp 36 °C (96.8 °F) 06/13/22 1215   Pulse 65 06/13/22 1330   Resp 18 06/13/22 1330   SpO2 97 % 06/13/22 1330   Vitals shown include unvalidated device data.      No case tracking events are documented in the log.      Pain/Kiko Score: Pain Rating Prior to Med Admin: 0 (6/13/2022  9:15 AM)  Kiko Score: 10 (6/13/2022  1:15 PM)

## 2022-06-14 NOTE — TELEPHONE ENCOUNTER
----- Message from Jesenia Jimenez sent at 6/14/2022  1:52 PM CDT -----  Brunilda pacheco want to know if you can write a letter for her to be excuse for Jury Duty.  She stated they told her if she was Pt primary caregiver during treatment for surgery they could excuse her. She is suppose to report to Jury Duty on 06/17/22.  Yamile call back [phone].  email address to courts: [email or web address] and CC: [email or web address]

## 2022-06-14 NOTE — CARE UPDATE
Called patient on 6/14/22. Having issues with cassette past of Nimbus becoming disconnected but able to snap back into place. Patient made aware of Nimbus contact number for further assistance with device. Patient is otherwise doing well.     John Nelson MD

## 2022-06-16 PROCEDURE — G0180 MD CERTIFICATION HHA PATIENT: HCPCS | Mod: ,,, | Performed by: ORTHOPAEDIC SURGERY

## 2022-06-16 PROCEDURE — G0180 PR HOME HEALTH MD CERTIFICATION: ICD-10-PCS | Mod: ,,, | Performed by: ORTHOPAEDIC SURGERY

## 2022-06-17 NOTE — CARE UPDATE
Called patient on 6/17/22. Nimbus functioning well and patient is comfortable. Patient plans to pull catheter tonight or tomorrow morning.     John Nelson MD

## 2022-06-27 ENCOUNTER — OFFICE VISIT (OUTPATIENT)
Dept: ORTHOPEDICS | Facility: CLINIC | Age: 77
End: 2022-06-27
Payer: MEDICARE

## 2022-06-27 VITALS — SYSTOLIC BLOOD PRESSURE: 104 MMHG | DIASTOLIC BLOOD PRESSURE: 60 MMHG | RESPIRATION RATE: 18 BRPM | HEART RATE: 76 BPM

## 2022-06-27 DIAGNOSIS — S82.032B: Primary | ICD-10-CM

## 2022-06-27 PROCEDURE — 99999 PR PBB SHADOW E&M-EST. PATIENT-LVL III: CPT | Mod: PBBFAC,,, | Performed by: PHYSICIAN ASSISTANT

## 2022-06-27 PROCEDURE — 99024 POSTOP FOLLOW-UP VISIT: CPT | Mod: POP,,, | Performed by: PHYSICIAN ASSISTANT

## 2022-06-27 PROCEDURE — 99024 PR POST-OP FOLLOW-UP VISIT: ICD-10-PCS | Mod: POP,,, | Performed by: PHYSICIAN ASSISTANT

## 2022-06-27 PROCEDURE — 99999 PR PBB SHADOW E&M-EST. PATIENT-LVL III: ICD-10-PCS | Mod: PBBFAC,,, | Performed by: PHYSICIAN ASSISTANT

## 2022-06-27 PROCEDURE — 99213 OFFICE O/P EST LOW 20 MIN: CPT | Mod: PBBFAC | Performed by: PHYSICIAN ASSISTANT

## 2022-06-27 NOTE — PROGRESS NOTES
Principal Orthopedic Problem: Grade 1 open Left patella fracture     Relevant Medical History: according to chart    PMHX: HTN, hypothyroidism, and HPLD   They deny IV drug use.   They deny tobacco use.   They deny alcohol use.   They deny immunosuppressant medications.  They deny chemotherapy.  They deny radiation.      HPI: Ms. Bass is a 76 year old female who presented to the ED on 06/07/22 with left knee pain after fall.   RADS: CT: Transverse plane fracture of the patella with 2.5 cm of distraction.  Distal fragment appears comminuted.  Several punctate foci of gas concerning for open injury.  06/13/22: :   Open reduction internal fixation of left patella fracture  Excisional and nonexcisional Debridement irrigation open left patella fracture, subcutaneous tissue and bone     Ms. Bass is here today for a post-operative visit    Interval History:  she reports that she is doing ok.   she is at home . she is not participating in PT/OT.   Pain is controleld.  she is  taking pain medication.    she denies fever, chills, and sweats since the time of the surgery.     Complications since time of surgery: none     Physical exam:  Dressing taken down.  Incision is clean, dry and intact.  Sutures removed without difficulty.   Healing well no signs of breakdown or infection.    RADS: All pertinent images were reviewed by myself:   none done today    Assessment:  Post-op visit ( 2 weeks)    Plan:  Current care, treatment plan, precautions, activity level/ modifications, limitations, rehabilitation exercises and proposed future treatment were discussed with the patient. We discussed the need to monitor for changes in symptoms and condition and report them to the physician.  Discussed importance of compliance with all appointments and follow up examinations.     WOUND CARE ORDERS  - The patient was advised to keep the incision clean and dry for the next 24 hours after which she may wash the area with antibacterial  soap in the shower. Will not submerge until the incision is completely healed  -Patient was advised to monitor wound closely and multiple times daily for any problems. Call clinic immediately or report to ED for immediate medical attention for any complications including reopening of wound, drainage, purulence, redness, streaking, odor, pain out of proportion, fever, chills, etc.       ACTIVITY:   -PT/OT, hold, Patient is responsible to establish and continue care   -range of motion :  straight in the hinged knee brace for the 1st 4 weeks    -weight bear as tolerated in the hinged knee brace locked in extension     PAIN MEDICATION:   - Multimodal pain control  - Pain medication: refill was not needed  - Pain medication refill policy provided to patient for review, yes.    - Patient was informed a multi-modal approach is used to treat their pain. With the goal to get off of narcotic pain medication and discontinue as soon as possible.   - ice and elevation to reduce pain and swelling     DVT PROPHYLAXIS:   - ASA 81 mg bid     FOLLOW UP:   - Patient will follow up in the clinic in 2 weeks.  - X-ray of her knee 2 view AP lat OOB do not bend the knee is needed.  - consider progressive range of motion.       If there are any questions prior to scheduled follow up, the patient was instructed to contact the office

## 2022-06-28 ENCOUNTER — EXTERNAL HOME HEALTH (OUTPATIENT)
Dept: HOME HEALTH SERVICES | Facility: HOSPITAL | Age: 77
End: 2022-06-28
Payer: MEDICARE

## 2022-06-29 ENCOUNTER — TELEPHONE (OUTPATIENT)
Dept: ORTHOPEDICS | Facility: CLINIC | Age: 77
End: 2022-06-29
Payer: MEDICARE

## 2022-06-29 NOTE — TELEPHONE ENCOUNTER
----- Message from Rachel Ruano PA-C sent at 6/29/2022  7:41 AM CDT -----  Regarding: FW: Plan of care  Contact: [phone]  Please get out patient x-ray just to make sure all is ok.   Rachel Kim     ----- Message -----  From: Eleanor Levin MA  Sent: 6/28/2022   4:35 PM CDT  To: Rachel Ruano PA-C  Subject: FW: Plan of care                                 Pt stepped out of car wrong after appt on yesterday thought she may have did some damage but stated therapist looked at it and she just a little swollen but nothing severe.   ----- Message -----  From: Carisa Cabrera  Sent: 6/28/2022  10:28 AM CDT  To: [facility] Staff  Subject: Plan of care                                     Calling to speak with provider regarding plan of care and event happened on yesterday with wally. Please call

## 2022-06-29 NOTE — TELEPHONE ENCOUNTER
Spoke with pt. Regarding an x-ray. Pt states that her left knee feels better today and she don't think she needs an x-ray  At this time. And states that her therapist states that it's fine. Pt states that she's walking fine with using her walker. Patient states verbal understanding and has no further questions.

## 2022-07-08 ENCOUNTER — PATIENT MESSAGE (OUTPATIENT)
Dept: ORTHOPEDICS | Facility: CLINIC | Age: 77
End: 2022-07-08
Payer: MEDICARE

## 2022-07-11 ENCOUNTER — PATIENT MESSAGE (OUTPATIENT)
Dept: ORTHOPEDICS | Facility: CLINIC | Age: 77
End: 2022-07-11
Payer: MEDICARE

## 2022-07-16 ENCOUNTER — HOSPITAL ENCOUNTER (EMERGENCY)
Facility: HOSPITAL | Age: 77
Discharge: HOME | End: 2022-07-16
Attending: EMERGENCY MEDICINE
Payer: MEDICARE

## 2022-07-16 VITALS
SYSTOLIC BLOOD PRESSURE: 112 MMHG | WEIGHT: 160.25 LBS | HEIGHT: 60 IN | BODY MASS INDEX: 31.46 KG/M2 | RESPIRATION RATE: 16 BRPM | DIASTOLIC BLOOD PRESSURE: 65 MMHG | TEMPERATURE: 98 F | HEART RATE: 77 BPM | OXYGEN SATURATION: 95 %
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
## 2022-07-16 DIAGNOSIS — S82.001A CLOSED DISPLACED FRACTURE OF RIGHT PATELLA, UNSPECIFIED FRACTURE MORPHOLOGY, INITIAL ENCOUNTER: ICD-10-CM

## 2022-07-16 DIAGNOSIS — S09.90XA INJURY OF HEAD, INITIAL ENCOUNTER: Primary | ICD-10-CM

## 2022-07-16 LAB
BUN SERPL-MCNC: 21 MG/DL (ref 6–30)
CHLORIDE SERPL-SCNC: 96 MMOL/L (ref 95–110)
CREAT SERPL-MCNC: 1.3 MG/DL (ref 0.5–1.4)
GLUCOSE SERPL-MCNC: 121 MG/DL (ref 70–110)
HCT VFR BLD CALC: 33 %PCV (ref 36–54)
POC IONIZED CALCIUM: 1.09 MMOL/L (ref 1.06–1.42)
POC TCO2 (MEASURED): 23 MMOL/L (ref 23–29)
POTASSIUM BLD-SCNC: 3.7 MMOL/L (ref 3.5–5.1)
SAMPLE: ABNORMAL
SODIUM BLD-SCNC: 129 MMOL/L (ref 136–145)

## 2022-07-16 PROCEDURE — 99285 EMERGENCY DEPT VISIT HI MDM: CPT | Mod: ,,, | Performed by: EMERGENCY MEDICINE

## 2022-07-16 PROCEDURE — 99285 PR EMERGENCY DEPT VISIT,LEVEL V: ICD-10-PCS | Mod: ,,, | Performed by: EMERGENCY MEDICINE

## 2022-07-16 PROCEDURE — 86803 HEPATITIS C AB TEST: CPT | Performed by: PHYSICIAN ASSISTANT

## 2022-07-16 PROCEDURE — 80047 BASIC METABLC PNL IONIZED CA: CPT

## 2022-07-16 PROCEDURE — 99284 EMERGENCY DEPT VISIT MOD MDM: CPT | Mod: 25

## 2022-07-16 NOTE — DISCHARGE INSTRUCTIONS
Your emergency department exam, history of illness and labs and imaging did not show any signs of dangerous medical conditions.    Please follow-up with your primary care doctor within 1 week for continued evaluation of your symptoms.  You can take over-the-counter Tylenol for pain.    If you develop any new, worsening or worrisome symptoms please return to the emergency department for re-evaluation.    Incidental finding to follow up with your primary care doctor:  Bilateral pulmonary nodules in the upper lobes near the apices right greater than left with the largest measuring 7 mm in the right upper lobe.  Metastatic process is a consideration and follow-up is recommended.  See above comments.

## 2022-07-16 NOTE — ED PROVIDER NOTES
"Encounter Date: 7/16/2022       History     Chief Complaint   Patient presents with    Fall     Pt wears a leg brace on left lower leg but states her upper part of her leg " my thigh gave way"   Pt hit the back of her head but had no LOC  bleeding noted to back of head      75 y/o f, h/o HTN, s/p ORIF 1 month ago here for left patellar fracture, has been working with PT since then, presenting for head injury, this am, reports that her left thigh gave out on her, fell backwards and struck occipital scalp, no LOC.  No severe HA, no extremity numbness/weakness, no n/v, no neck pain.  Pt reports that she's been making a lot of progress with PT but that her L thigh has intermittently been giving out on her this week.  She can still bear weight and walk w her walker.  Relative concerned that brace is falling off of her leg as swelling from surgery has receded.    The history is provided by the patient.     Review of patient's allergies indicates:   Allergen Reactions    Aspirin      "chest burns"  Low dose aspirin ok    Lisinopril Other (See Comments)     Coughing     Past Medical History:   Diagnosis Date    Hyperlipidemia     Hypertension     Thyroid disease      Past Surgical History:   Procedure Laterality Date    OPEN REDUCTION AND INTERNAL FIXATION (ORIF) OF FRACTURE OF PATELLA Left 6/13/2022    Procedure: ORIF, FRACTURE, PATELLA;  Surgeon: Al Anderson MD;  Location: [facility] OR [address] [place] [address];  Service: Orthopedics;  Laterality: Left;    TONSILLECTOMY       Family History   Problem Relation Age of Onset    No Known Problems Mother     No Known Problems Father      Social History     Tobacco Use    Smoking status: Never Smoker    Smokeless tobacco: Never Used   Substance Use Topics    Alcohol use: Not Currently    Drug use: Never     Review of Systems   Constitutional: Negative for chills and fever.   Gastrointestinal: Negative for nausea and vomiting.   Musculoskeletal: Negative for back pain and neck " pain.   Neurological: Negative for dizziness, syncope, weakness, light-headedness, numbness and headaches.   All other systems reviewed and are negative.      Physical Exam     Initial Vitals [07/16/22 1219]   BP Pulse Resp Temp SpO2   114/84 84 15 98.3 °F (36.8 °C) 98 %      MAP       --         Physical Exam    Nursing note and vitals reviewed.  Constitutional: Vital signs are normal. She appears well-developed and well-nourished. She is not diaphoretic.  Non-toxic appearance. She does not appear ill. No distress.   HENT:   Mouth/Throat: Oropharynx is clear and moist and mucous membranes are normal. Mucous membranes are not dry.   abrasion to occipital scalp w small hematoma, no laceration   Eyes: Conjunctivae and lids are normal. Pupils are equal, round, and reactive to light.   Neck: Neck supple.   No midline cspine tend   Normal range of motion.  Cardiovascular: Normal rate.   Pulmonary/Chest: No respiratory distress.   Musculoskeletal:         General: No edema.      Cervical back: Normal range of motion and neck supple.      Comments: L LE in full leg brace  Able to bear weight, sit to stand as 1 person assist, can walk as 1 person assist     Neurological: She is alert and oriented to person, place, and time. She has normal strength. No sensory deficit. GCS score is 15. GCS eye subscore is 4. GCS verbal subscore is 5. GCS motor subscore is 6.   Skin: Skin is dry and intact. No pallor.   Psychiatric: She has a normal mood and affect. Her speech is normal and behavior is normal.         ED Course   Procedures  Labs Reviewed   ISTAT PROCEDURE - Abnormal; Notable for the following components:       Result Value    POC Glucose 121 (*)     POC Sodium 129 (*)     POC Hematocrit 33 (*)     All other components within normal limits   HEPATITIS C ANTIBODY          Imaging Results          CT Head Without Contrast (Final result)  Result time 07/16/22 16:33:37    Final result by Matt Asher MD (07/16/22 16:33:37)                  Impression:      No acute abnormality.      Electronically signed by: Matt Asher  Date:    07/16/2022  Time:    16:33             Narrative:    EXAMINATION:  CT HEAD WITHOUT CONTRAST    CLINICAL HISTORY:  Head trauma, minor (Age >= 65y);    TECHNIQUE:  Low dose axial CT images obtained throughout the head without intravenous contrast. Sagittal and coronal reconstructions were performed.    COMPARISON:  None.    FINDINGS:  Intracranial compartment:    Ventricles and sulci are normal in size for age without evidence of hydrocephalus. No extra-axial blood or fluid collections.    The brain parenchyma appears normal. No parenchymal mass, hemorrhage, edema or major vascular distribution infarct.    Skull/extracranial contents (limited evaluation): No fracture. Mastoid air cells and paranasal sinuses are essentially clear.                                CT Cervical Spine Without Contrast (Final result)  Result time 07/16/22 16:45:00    Final result by Matt Asher MD (07/16/22 16:45:00)                 Impression:      1. No acute abnormality.  2. Mild degenerative changes primarily at C2-3.  See above comments.  3. Bilateral pulmonary nodules in the upper lobes near the apices right greater than left with the largest measuring 7 mm in the right upper lobe.  Metastatic process is a consideration and follow-up is recommended.  See above comments.  4.  This report was flagged in Epic as abnormal.      Electronically signed by: Matt Asher  Date:    07/16/2022  Time:    16:45             Narrative:    EXAMINATION:  CT CERVICAL SPINE WITHOUT CONTRAST    CLINICAL HISTORY:  Neck trauma (Age >= 65y);    TECHNIQUE:  Low dose axial CT images through the cervical spine, with sagittal and coronal reformations.  Contrast was not administered.    COMPARISON:  None    FINDINGS:  No acute cervical fractures.  Mild grade 1 spondylolisthesis of C2 on C3.  Moderate facet arthropathy on the right at C2-3.    Disc  spaces appear adequately maintained.  No significant disc bulge or protrusion.  The posterior elements are intact.  Prevertebral soft tissues appear within normal limits.    Mild right foraminal narrowing at C2-3.    Limited evaluation of the intraspinal contents demonstrates no hematoma or mass.Paraspinal soft tissues exhibit no acute abnormalities.    Incidental note made of a multiple bilateral pulmonary nodules near the lung apices right greater than left.  The largest measures 7 mm in the right upper lobe on axial 264.  Metastatic process is a consideration.  Follow-up is recommended.    5 mm nodule right upper lobe on axial 258, 5 mm nodule on axial 257.  4 mm nodule left upper lobe on axial 279 and 3 mm nodule left upper lobe on axial 257.                               X-Ray Pelvis Routine AP (Final result)  Result time 07/16/22 14:56:58   Procedure changed from X-Ray Hip 2 or 3 views Left (with Pelvis when performed)     Final result by Antonella Mendenhall MD (07/16/22 14:56:58)                 Impression:      There are no acute findings on this exam.      Electronically signed by: Antonella Mendenhall MD  Date:    07/16/2022  Time:    14:56             Narrative:    EXAMINATION:  XR PELVIS ROUTINE AP    CLINICAL HISTORY:  fall;  Unspecified fall, initial encounter    TECHNIQUE:  AP radiograph of the pelvis was submitted.    COMPARISON:  06/07/2022    FINDINGS:  Moderate bilateral axial joint space narrowing of the hips is present.  Senescent changes are identified in the lumbar spine.  Otherwise, the osseous structures, soft tissues and joint spaces show no fracture, osseous destructive process or soft tissue abnormality.                               X-Ray Knee 3 View Left (Final result)  Result time 07/16/22 14:52:05    Final result by Antonella Mendenhall MD (07/16/22 14:52:05)                 Impression:      1. Internal fixation of patellar fracture with residual edema in the soft tissues and nonunion of  the distal fracture fragment.  2. Stable osteoarthritis of the knee.  3. No acute findings.      Electronically signed by: Antonella Mendenhall MD  Date:    07/16/2022  Time:    14:52             Narrative:    EXAMINATION:  XR FEMUR 2 VIEW LEFT; XR KNEE 3 VIEW LEFT    TECHNIQUE:  AP and lateral views of the left femur as well as AP lateral and Merchant views of the left knee were performed.    COMPARISON:  06/07/2022    FINDINGS:  Internal fixation of the patella with screw instrumentation and cerclage wire shows no complicating features today.  The residual avulsed patellar fracture is identified caudal to the internally fixed portion of the patella.  There is edema in Hoffa's pad pad as well as edema in the anterior soft tissues of the knee.  Thickening of the patellar tendon is present.  No acute fractures identified.  Moderate medial and lateral compartment joint space loss present.                               X-Ray Femur AP/LAT Left (Final result)  Result time 07/16/22 14:52:05    Final result by Antonella Mendenhall MD (07/16/22 14:52:05)                 Impression:      1. Internal fixation of patellar fracture with residual edema in the soft tissues and nonunion of the distal fracture fragment.  2. Stable osteoarthritis of the knee.  3. No acute findings.      Electronically signed by: Antonella Mendenhall MD  Date:    07/16/2022  Time:    14:52             Narrative:    EXAMINATION:  XR FEMUR 2 VIEW LEFT; XR KNEE 3 VIEW LEFT    TECHNIQUE:  AP and lateral views of the left femur as well as AP lateral and Merchant views of the left knee were performed.    COMPARISON:  06/07/2022    FINDINGS:  Internal fixation of the patella with screw instrumentation and cerclage wire shows no complicating features today.  The residual avulsed patellar fracture is identified caudal to the internally fixed portion of the patella.  There is edema in Hoffa's pad pad as well as edema in the anterior soft tissues of the knee.   Thickening of the patellar tendon is present.  No acute fractures identified.  Moderate medial and lateral compartment joint space loss present.                                 Medications - No data to display  Medical Decision Making:   History:   Old Medical Records: I decided to obtain old medical records.  Initial Assessment:   1. Head injury, GCS 15, s/p mechanical fall, no anticoagulation, on ASA  -CT head/cspine to r/o traumatic head bleed  -may need wound repair  -Tdap UTD 2020    2. Fall, seems related to L LE brace and recent surgery.  Pt is still able to bear weight and leg strength seems relatively well-preserved.  Brace is clearly ill-fitting and I wonder if this is creating problems with mobility  -discussed with ortho resident on call - recommends DME rep evaluate and re-fits brace  -xrays of L LE  -has ortho f/u already scheduled  Clinical Tests:   Lab Tests: Ordered and Reviewed  Radiological Study: Ordered and Reviewed  ED Management:  3:40 PM  Scalp wound cleaned, no laceration, only a small abrasion  RN to dress  Awaiting CT  Discussed labs, xrays  Other:   I have discussed this case with another health care provider.       <> Summary of the Discussion: Ortho resident    Signed out to Dr. Santos at 3 pm, pending CT and reassessment             ED Course as of 07/17/22 1102   Sat Jul 16, 2022   1448 POC Sodium(!): 129  Mild hyponatremia.  Na 133 on last check.  HCT 33, 35 on last check. [AS]      ED Course User Index  [AS] Lin Hale MD             Clinical Impression:   Final diagnoses:  [W19.XXXA] Fall  [S09.90XA] Injury of head, initial encounter (Primary)  [S82.001A] Closed displaced fracture of right patella, unspecified fracture morphology, initial encounter          ED Disposition Condition    Discharge Stable        ED Prescriptions     None        Follow-up Information     Follow up With Specialties Details Why Contact Info    Rachel Ruano PA-C Orthopedic Surgery Schedule an  appointment as soon as possible for a visit in 2 days  [id number]  [facility] [id number]  [phone]             Lin Hale MD  07/17/22 1105

## 2022-07-16 NOTE — ED NOTES
I-STAT Chem-8+ Results:   Value Reference Range   Sodium 129 136-145 mmol/L   Potassium  3.7 3.5-5.1 mmol/L   Chloride 96  mmol/L   Ionized Calcium 1.09 1.06-1.42 mmol/L   CO2 (measured) 23 23-29 mmol/L   Glucose 121  mg/dL   BUN 21 6-30 mg/dL   Creatinine 1.3 0.5-1.4 mg/dL   Hematocrit 33 36-54%

## 2022-07-16 NOTE — PROVIDER PROGRESS NOTES - EMERGENCY DEPT.
Encounter Date: 7/16/2022    ED Physician Progress Notes        Physician Note:   Signed out to me.    Here with fall, minor head trauma.   Labs and x-ray imaging are benign.  Pending CT head and C-spine.  Plan is if those are unremarkable discharge home with supportive care, outpatient follow-up and return precautions.    Update:  CTs are not remarkable for any dangerous pathology.  There is incidental findings of pulmonary lung nodules.  This information was passed on the patient and explained to her that she should follow-up with her primary care doctor for further evaluation within 1-2 weeks as there can be some concern for malignancy.    Well-appearing no acute distress in the emergency department.    Findings of ED work up explained to patient. Patient agrees with discharge plan and verbalizes understanding of return precautions.

## 2022-07-16 NOTE — ED TRIAGE NOTES
76 y.o. female arrived to the ED via personal transport from home for fall. Pt reports ambulating at home w/ walker when left leg suddenly gave out causing her to sustain a fall to the back of her head and right ear. Pt in ACE wrap and left knee brace following knee surgery approx one month ago. Blood and lac noted to pt's occipital region of skull. -LOC, -blood thinner use.

## 2022-07-17 PROBLEM — S82.032M: Status: ACTIVE | Noted: 2022-06-07

## 2022-07-17 NOTE — ASSESSMENT & PLAN NOTE
Brunilda Bass is a 76 y.o. female s/p ORIF L patella with Dr. Anderson on 6/13 now with failure of her fixation with significant displacement of the superior and inferior patellar fragments. She is closed and neurovascularly intact. Her extension mechanism is out. She will require revision surgery of her left patella. Patient is scheduled to see Rachel in clinic next week, but we will arrange for her to be seen earlier for surgical discussions.    Plan:  NWB RLE in knee immobilizer locked in extension  Continue to use walker or wheelchair for mobilization  Follow up in clinic early this week (patient will be contacted with appointment details)

## 2022-07-17 NOTE — SUBJECTIVE & OBJECTIVE
"Past Medical History:   Diagnosis Date    Hyperlipidemia     Hypertension     Thyroid disease        Past Surgical History:   Procedure Laterality Date    OPEN REDUCTION AND INTERNAL FIXATION (ORIF) OF FRACTURE OF PATELLA Left 6/13/2022    Procedure: ORIF, FRACTURE, PATELLA;  Surgeon: Al Anderson MD;  Location: [facility] OR [address] [place] [address];  Service: Orthopedics;  Laterality: Left;    TONSILLECTOMY         Review of patient's allergies indicates:   Allergen Reactions    Aspirin      "chest burns"  Low dose aspirin ok    Lisinopril Other (See Comments)     Coughing       No current facility-administered medications for this encounter.     Current Outpatient Medications   Medication Sig    acetaminophen (TYLENOL) 650 MG TbSR Take 1 tablet (650 mg total) by mouth every 8 (eight) hours.    aspirin (ECOTRIN) 81 MG EC tablet Take 81 mg by mouth 2 (two) times a day.    atorvastatin (LIPITOR) 20 MG tablet Take by mouth every evening.    cetirizine (ZYRTEC) 10 MG tablet Take by mouth nightly as needed.    chlorthalidone (HYGROTEN) 25 MG Tab Take 25 mg by mouth nightly.    docusate sodium (COLACE) 100 MG capsule Take 1 capsule (100 mg total) by mouth 2 (two) times daily as needed for Constipation. Take this medication with prescription pain medicine    fluticasone propionate (FLONASE) 50 mcg/actuation nasal spray SHAKE LQ AND U 2 SPRAYS IEN QD UTD    levothyroxine (SYNTHROID) 50 MCG tablet Take by mouth before breakfast.    loratadine (CLARITIN) 10 mg tablet Take 10 mg by mouth nightly as needed for Allergies.    metoprolol succinate (TOPROL-XL) 50 MG 24 hr tablet Take by mouth every evening.    omeprazole (PRILOSEC) 20 MG capsule Take by mouth every morning.    ondansetron (ZOFRAN-ODT) 4 MG TbDL Take 1 tablet (4 mg total) by mouth every 8 (eight) hours as needed (nausea or vomiting).    oxyCODONE (ROXICODONE) 5 MG immediate release tablet Take 1 tablet (5 mg total) by mouth every 6 (six) hours as needed for Pain. May take 1-2 " tablets every 6 hours as needed for pain    sertraline (ZOLOFT) 50 MG tablet Take by mouth every evening.     Family History       Problem Relation (Age of Onset)    No Known Problems Mother, Father          Tobacco Use    Smoking status: Never Smoker    Smokeless tobacco: Never Used   Substance and Sexual Activity    Alcohol use: Not Currently    Drug use: Never    Sexual activity: Not Currently     ROS  Constitutional: Denies fever/chills   Neurological: Denies numbness/tingling (any exceptions noted in orthopaedic exam)    Psychiatric/Behavioral: Denies change in normal mood   Eyes: Denies change in vision   Cardiovascular: Denies chest pain   Respiratory: Denies shortness of breath   Hematologic/Lymphatic: Denies easy bleeding/bruising    Skin: Denies new rash or skin lesions    Gastrointestinal: Denies nausea/vomitting/diarrhea, change in bowel habits, abdominal pain    Allergic/Immunologic: Denies adverse reactions to current medications   Musculoskeletal: see HPI     Objective:     Vital Signs (Most Recent):  Temp: 98.3 °F (36.8 °C) (07/16/22 1219)  Pulse: 77 (07/16/22 1554)  Resp: 16 (07/16/22 1554)  BP: 112/65 (07/16/22 1554)  SpO2: 95 % (07/16/22 1554) Vital Signs (24h Range):  Temp:  [98.3 °F (36.8 °C)] 98.3 °F (36.8 °C)  Pulse:  [77-84] 77  Resp:  [15-16] 16  SpO2:  [95 %-98 %] 95 %  BP: (112-114)/(65-84) 112/65     Weight: 72.7 kg (160 lb 4.4 oz)  Height: 5' (152.4 cm)  Body mass index is 31.3 kg/m².    No intake or output data in the 24 hours ending 07/17/22 0800    Ortho/SPM Exam  General: no acute distress, appears stated age     Neuro: alert and oriented x3   Psych: normal mood   Head: normocephalic, atraumatic.    Eyes: no scleral icterus   Mouth: moist mucous membranes   Cardiovascular: extremities warm and well perfused   Lungs: breathing comfortably, equal chest rise bilat   Skin: clean, dry, intact (any exceptions noted in below musculoskeletal exam)    Musculoskeletal:  LUE:  - Skin intact  throughout, no open wounds  - No swelling  - No ecchymosis, erythema, or signs of cellulitis  - NonTTP throughout  - AROM and PROM of the shoulder, elbow, wrist, and hand intact without pain  - Axillary/AIN/PIN/Radial/Median/Ulnar nerves assessed in isolation and are without deficit  - SILT throughout  - Compartments soft  - 2+ radial artery pulse  - Capillary Refill < 2 sec    RUE:  - Skin intact throughout, no open wounds  - No swelling  - No ecchymosis, erythema, or signs of cellulitis  - NonTTP throughout  - AROM and PROM of the shoulder, elbow, wrist, and hand intact without pain  - Axillary/AIN/PIN/Radial/Median/Ulnar nerves assessed in isolation and are without deficit  - SILT throughout  - Compartments soft  - 2+ radial artery pulse  - Capillary Refill < 2 sec    LLE:  - Skin intact throughout, no open wounds  - No swelling  - No ecchymosis, erythema, or signs of cellulitis  - NonTTP throughout  - AROM and PROM of the hip, knee, ankle, and foot intact without pain  - TA/EHL/Gastroc/FHL assessed in isolation and are without deficit  - SILT throughout  - Compartments soft  - 2+ DP and PT pulses  - Capillary Refill < 2 sec  - Negative Log roll    RLE:  - Midline anterior knee incision healing well; no dehiscence or signs of infection  - Palpable defect from patellar separation  - No ecchymosis, erythema, or signs of cellulitis  - NonTTP throughout  - Unable to perform a straight leg raise; extensor mechanism not intact  - AROM and PROM of the hip, ankle, and foot intact without pain  - TA/EHL/Gastroc/FHL assessed in isolation and are without deficit  - SILT throughout  - Compartments soft  - 2+ DP and PT pulses  - Capillary Refill < 2 sec  - Negative Log roll    Spine/pelvis/axial body:  No tenderness to palpation of cervical, thoracic, or lumbar spine  No pain with compression of pelvis  No decubitus ulcers    Significant Labs: All pertinent labs within the past 24 hours have been reviewed.    Significant  Imaging: I have reviewed and interpreted all pertinent imaging results/findings.  X-ray left knee with failure of fixation of the prior transverse patella fracture, significant displacement of the superior and inferior patellar fragments with the screw and wire construction in place

## 2022-07-17 NOTE — CONSULTS
"[facility]  Orthopedics  Consult Note    Patient Name: Brunilda Bass  MRN: [id number]  Admission Date: 7/16/2022  Hospital Length of Stay: 0 days  Attending Provider: No att. providers found  Primary Care Provider: NORMA Klein    Patient information was obtained from patient, past medical records and ER records.     Subjective:     Principal Problem: Fall    Chief Complaint:   Chief Complaint   Patient presents with    Fall     Pt wears a leg brace on left lower leg but states her upper part of her leg " my thigh gave way"   Pt hit the back of her head but had no LOC  bleeding noted to back of head         HPI: Brunilda Bass is a 76 y.o. female with PMHx of HTN, s/p ORIF L patella by Dr. Anderson 1 month ago who presented to the ED after a ground level fall. She reports that her leg gave out on her and she fell backwards onto her head. She denies loss of consciousness. Regarding her knee, she has been working with PT since the surgery and has been bearing weight in her hinged knee brace locked in 30 degrees of flexion.  Patient reports that she's been making a lot of progress with PT but that her L thigh has intermittently been giving out on her this week.  She can still bear weight and walk with her walker.  She denies headache, nausea, vomiting, numbness, tingling, and weakness.     Orthopedics was consulted for evaluation of her left patella.      Past Medical History:   Diagnosis Date    Hyperlipidemia     Hypertension     Thyroid disease        Past Surgical History:   Procedure Laterality Date    OPEN REDUCTION AND INTERNAL FIXATION (ORIF) OF FRACTURE OF PATELLA Left 6/13/2022    Procedure: ORIF, FRACTURE, PATELLA;  Surgeon: Al Anderson MD;  Location: [facility] OR [address] [place] [address];  Service: Orthopedics;  Laterality: Left;    TONSILLECTOMY         Review of patient's allergies indicates:   Allergen Reactions    Aspirin      "chest burns"  Low dose aspirin ok    Lisinopril Other (See Comments) "     Coughing       No current facility-administered medications for this encounter.     Current Outpatient Medications   Medication Sig    acetaminophen (TYLENOL) 650 MG TbSR Take 1 tablet (650 mg total) by mouth every 8 (eight) hours.    aspirin (ECOTRIN) 81 MG EC tablet Take 81 mg by mouth 2 (two) times a day.    atorvastatin (LIPITOR) 20 MG tablet Take by mouth every evening.    cetirizine (ZYRTEC) 10 MG tablet Take by mouth nightly as needed.    chlorthalidone (HYGROTEN) 25 MG Tab Take 25 mg by mouth nightly.    docusate sodium (COLACE) 100 MG capsule Take 1 capsule (100 mg total) by mouth 2 (two) times daily as needed for Constipation. Take this medication with prescription pain medicine    fluticasone propionate (FLONASE) 50 mcg/actuation nasal spray SHAKE LQ AND U 2 SPRAYS IEN QD UTD    levothyroxine (SYNTHROID) 50 MCG tablet Take by mouth before breakfast.    loratadine (CLARITIN) 10 mg tablet Take 10 mg by mouth nightly as needed for Allergies.    metoprolol succinate (TOPROL-XL) 50 MG 24 hr tablet Take by mouth every evening.    omeprazole (PRILOSEC) 20 MG capsule Take by mouth every morning.    ondansetron (ZOFRAN-ODT) 4 MG TbDL Take 1 tablet (4 mg total) by mouth every 8 (eight) hours as needed (nausea or vomiting).    oxyCODONE (ROXICODONE) 5 MG immediate release tablet Take 1 tablet (5 mg total) by mouth every 6 (six) hours as needed for Pain. May take 1-2 tablets every 6 hours as needed for pain    sertraline (ZOLOFT) 50 MG tablet Take by mouth every evening.     Family History       Problem Relation (Age of Onset)    No Known Problems Mother, Father          Tobacco Use    Smoking status: Never Smoker    Smokeless tobacco: Never Used   Substance and Sexual Activity    Alcohol use: Not Currently    Drug use: Never    Sexual activity: Not Currently     ROS  Constitutional: Denies fever/chills   Neurological: Denies numbness/tingling (any exceptions noted in orthopaedic exam)     Psychiatric/Behavioral: Denies change in normal mood   Eyes: Denies change in vision   Cardiovascular: Denies chest pain   Respiratory: Denies shortness of breath   Hematologic/Lymphatic: Denies easy bleeding/bruising    Skin: Denies new rash or skin lesions    Gastrointestinal: Denies nausea/vomitting/diarrhea, change in bowel habits, abdominal pain    Allergic/Immunologic: Denies adverse reactions to current medications   Musculoskeletal: see HPI     Objective:     Vital Signs (Most Recent):  Temp: 98.3 °F (36.8 °C) (07/16/22 1219)  Pulse: 77 (07/16/22 1554)  Resp: 16 (07/16/22 1554)  BP: 112/65 (07/16/22 1554)  SpO2: 95 % (07/16/22 1554) Vital Signs (24h Range):  Temp:  [98.3 °F (36.8 °C)] 98.3 °F (36.8 °C)  Pulse:  [77-84] 77  Resp:  [15-16] 16  SpO2:  [95 %-98 %] 95 %  BP: (112-114)/(65-84) 112/65     Weight: 72.7 kg (160 lb 4.4 oz)  Height: 5' (152.4 cm)  Body mass index is 31.3 kg/m².    No intake or output data in the 24 hours ending 07/17/22 0800    Ortho/SPM Exam  General: no acute distress, appears stated age     Neuro: alert and oriented x3   Psych: normal mood   Head: normocephalic, small laceration to occipital scalp - no active bleeding.    Eyes: no scleral icterus   Mouth: moist mucous membranes   Cardiovascular: extremities warm and well perfused   Lungs: breathing comfortably, equal chest rise bilat   Skin: clean, dry, intact (any exceptions noted in below musculoskeletal exam)    Musculoskeletal:  LUE:  - Skin intact throughout, no open wounds  - No swelling  - No ecchymosis, erythema, or signs of cellulitis  - NonTTP throughout  - AROM and PROM of the shoulder, elbow, wrist, and hand intact without pain  - Axillary/AIN/PIN/Radial/Median/Ulnar nerves assessed in isolation and are without deficit  - SILT throughout  - Compartments soft  - 2+ radial artery pulse  - Capillary Refill < 2 sec    RUE:  - Skin intact throughout, no open wounds  - No swelling  - No ecchymosis, erythema, or signs of  cellulitis  - NonTTP throughout  - AROM and PROM of the shoulder, elbow, wrist, and hand intact without pain  - Axillary/AIN/PIN/Radial/Median/Ulnar nerves assessed in isolation and are without deficit  - SILT throughout  - Compartments soft  - 2+ radial artery pulse  - Capillary Refill < 2 sec    LLE:  - Skin intact throughout, no open wounds  - No swelling  - No ecchymosis, erythema, or signs of cellulitis  - NonTTP throughout  - AROM and PROM of the hip, knee, ankle, and foot intact without pain  - TA/EHL/Gastroc/FHL assessed in isolation and are without deficit  - SILT throughout  - Compartments soft  - 2+ DP and PT pulses  - Capillary Refill < 2 sec  - Negative Log roll    RLE:  - Midline anterior knee incision healing well; no dehiscence or signs of infection  - Palpable defect from patellar separation  - No ecchymosis, erythema, or signs of cellulitis  - NonTTP throughout  - Unable to perform a straight leg raise; extensor mechanism not intact  - AROM and PROM of the hip, ankle, and foot intact without pain  - TA/EHL/Gastroc/FHL assessed in isolation and are without deficit  - SILT throughout  - Compartments soft  - 2+ DP and PT pulses  - Capillary Refill < 2 sec  - Negative Log roll    Spine/pelvis/axial body:  No tenderness to palpation of cervical, thoracic, or lumbar spine  No pain with compression of pelvis  No decubitus ulcers    Significant Labs: All pertinent labs within the past 24 hours have been reviewed.    Significant Imaging: I have reviewed and interpreted all pertinent imaging results/findings.  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 female s/p ORIF L patella with   Justin on 6/13 now with failure of her fixation with significant displacement of the superior and inferior patellar fragments. She is closed and neurovascularly intact. Her extension mechanism is out. She will require revision surgery of her left patella. Patient is scheduled to see Rachel in clinic next week, but we will arrange for her to be seen earlier for surgical discussions.    Plan:  NWB RLE in knee immobilizer locked in extension  Continue to use walker or wheelchair for mobilization  Follow up in clinic early this week (patient will be contacted with appointment details)      Nba Walsh MD  Orthopedics  [facility]

## 2022-07-17 NOTE — HPI
Brunilda Bass is a 76 y.o. female with PMHx of HTN, s/p ORIF L patella by Dr. Anderson 1 month ago who presented to the ED after a ground level fall. She reports that her leg gave out on her and she fell backwards onto her head. She denies loss of consciousness. Regarding her knee, she has been working with PT since the surgery and has been bearing weight in her hinged knee brace locked in 30 degrees of flexion.  Patient reports that she's been making a lot of progress with PT but that her L thigh has intermittently been giving out on her this week.  She can still bear weight and walk with her walker.  She denies headache, nausea, vomiting, numbness, tingling, and weakness.     Orthopedics was consulted for evaluation of her left patella.

## 2022-07-18 ENCOUNTER — TELEPHONE (OUTPATIENT)
Dept: ORTHOPEDICS | Facility: CLINIC | Age: 77
End: 2022-07-18
Payer: MEDICARE

## 2022-07-18 LAB — HCV AB SERPL QL IA: NEGATIVE

## 2022-07-18 NOTE — TELEPHONE ENCOUNTER
Called and Informed patient of appointment on 7/20/22 @ 9:00 am. Patient states verbal understanding and has no further questions.

## 2022-07-18 NOTE — TELEPHONE ENCOUNTER
----- Message from Nba Walsh MD sent at 7/18/2022  7:40 AM CDT -----  Please arrange follow up early this week for this patient for surgical discussions for her left patella after her repair from 1 month ago failed. Discussed with Dr. Anderson. Thanks!

## 2022-07-19 DIAGNOSIS — S82.032M: Primary | ICD-10-CM

## 2022-07-19 RX ORDER — MUPIROCIN 20 MG/G
OINTMENT TOPICAL
Status: CANCELLED | OUTPATIENT
Start: 2022-07-19

## 2022-07-19 RX ORDER — CLINDAMYCIN PHOSPHATE 900 MG/50ML
900 INJECTION, SOLUTION INTRAVENOUS
Status: CANCELLED | OUTPATIENT
Start: 2022-07-19

## 2022-07-20 ENCOUNTER — OFFICE VISIT (OUTPATIENT)
Dept: ORTHOPEDICS | Facility: CLINIC | Age: 77
End: 2022-07-20
Payer: MEDICARE

## 2022-07-20 VITALS
HEIGHT: 60 IN | WEIGHT: 160.25 LBS | HEART RATE: 82 BPM | SYSTOLIC BLOOD PRESSURE: 101 MMHG | BODY MASS INDEX: 31.46 KG/M2 | DIASTOLIC BLOOD PRESSURE: 64 MMHG | RESPIRATION RATE: 18 BRPM

## 2022-07-20 DIAGNOSIS — S82.032M: Primary | ICD-10-CM

## 2022-07-20 DIAGNOSIS — M80.00XA AGE-RELATED OSTEOPOROSIS WITH CURRENT PATHOLOGICAL FRACTURE, INITIAL ENCOUNTER: ICD-10-CM

## 2022-07-20 PROCEDURE — 99999 PR PBB SHADOW E&M-EST. PATIENT-LVL III: ICD-10-PCS | Mod: PBBFAC,,, | Performed by: PHYSICIAN ASSISTANT

## 2022-07-20 PROCEDURE — 99999 PR PBB SHADOW E&M-EST. PATIENT-LVL III: CPT | Mod: PBBFAC,,, | Performed by: PHYSICIAN ASSISTANT

## 2022-07-20 PROCEDURE — 99213 OFFICE O/P EST LOW 20 MIN: CPT | Mod: PBBFAC | Performed by: PHYSICIAN ASSISTANT

## 2022-07-20 PROCEDURE — 99024 PR POST-OP FOLLOW-UP VISIT: ICD-10-PCS | Mod: POP,,, | Performed by: PHYSICIAN ASSISTANT

## 2022-07-20 PROCEDURE — 99024 POSTOP FOLLOW-UP VISIT: CPT | Mod: POP,,, | Performed by: PHYSICIAN ASSISTANT

## 2022-07-20 RX ORDER — ERGOCALCIFEROL 1.25 MG/1
50000 CAPSULE ORAL
Qty: 1 CAPSULE | Refills: 5 | Status: SHIPPED | OUTPATIENT
Start: 2022-07-20 | End: 2022-08-19

## 2022-07-21 NOTE — PROGRESS NOTES
Principal Orthopedic Problem: Grade 1 open Left patella fracture     Relevant Medical History: according to chart    PMHX: HTN, hypothyroidism, and HPLD   They deny IV drug use.   They deny tobacco use.   They deny alcohol use.   They deny immunosuppressant medications.  They deny chemotherapy.  They deny radiation.      HPI: Ms. Bass is a 76 year old female who presented to the ED on 06/07/22 with left knee pain after fall.   RADS: CT: Transverse plane fracture of the patella with 2.5 cm of distraction.  Distal fragment appears comminuted.  Several punctate foci of gas concerning for open injury.  06/13/22: :   Open reduction internal fixation of left patella fracture  Excisional and nonexcisional Debridement irrigation open left patella fracture, subcutaneous tissue and bone   ED on 07/16/22 after fall when leg gave out.   RADS:  failed fixation with displacement of the inferior patellar fracture fragment and patella Marne of the proximal fracture fragment    Ms. Bass is here today for a post-operative/ pre operative  visit    Interval History:  she reports that she is doing ok, but not so well.   she is at home . she is not participating in PT/OT. Using a knee brace locked in extension and walker.   Pain is controlled.  she is  taking pain medication.    she denies fever, chills, and sweats since the time of the surgery.     Complications since time of surgery: none     Physical exam: arrived to clinic in a wheelchair ace wrap and knee brace in place.     RADS: All pertinent images were reviewed by myself and .    failed fixation with displacement of the inferior patellar fracture fragment and patella Marian of the proximal fracture fragment    Assessment:  Post-op visit    Plan:  Discussed treatment options with patient. Operative vs non-operative treatment discussed with patient. Recommend operative intervention. Patient agreed.  PLAN: revision ORIF on 07/25/22      - rest ice and elevation to  reduce swelling.    Discussed proper and consistent elevation of extremities, above the level of the heart, while at rest, to help control/improve edema and decrease pain.  -knee brace in extension.   - weight bearing as tolerated with brace in place.   - Pain medication: refill needed, no    - Discussed pain medication refill policy.      - consents signed,    - lab, chest x-ray and EKG ordered  previously , results in chart  - nto taking blood thinners       -Discussed COVID19 with the patient, they are aware of our current policies and procedures, were given the option of delaying surgery, and they elect to proceed. Covid testing to be done 48 hours prior to surgery.    - vaccinated     Pre, alivia, and post operative procedure and expectations were discussed.  Questions were answered. The patient has been educated and is ready to proceed with surgery.  Approximately 30 minutes was spent discussing surgical outcomes, plans, procedures, pre, alivia, and post operative expectations and care. The risks, benefits and alternatives to surgery were discussed with the patient at great length.  These include bleeding, infection, vessel/nerve damage, pain, numbness, tingling, complex regional pain syndrome, hardware/surgical failure, need for further surgery, malunion, nonunion, DVT, PE, arthritis and death. He also understands that the risks of surgery may be greater for some patients due to health or lifestyle issues, such as a current condition or a history of heart disease, obesity, clotting disorders, recurrent infections, smoking, sedentary lifestyle, or noncompliance with medications, therapy, or followup. The degree of the increased risk is hard to estimate with any degree of precision.  Patient states an understanding and wishes to proceed with surgery.   All questions were answered.  No guarantees were implied or stated.  Informed consent was obtained  The patient will contact us if the have any questions, concerns,  and changes in their medical condition prior to surgery.

## 2022-07-21 NOTE — H&P
Subjective:      Patient ID: Brunilda Bass is a 76 y.o. female.    Chief Complaint: Post-op Evaluation of the Left Knee    Principal Orthopedic Problem: Grade 1 open Left patella fracture     Relevant Medical History: according to chart    PMHX: HTN, hypothyroidism, and HPLD   They deny IV drug use.   They deny tobacco use.   They deny alcohol use.   They deny immunosuppressant medications.  They deny chemotherapy.  They deny radiation.      HPI: Ms. Bass is a 76 year old female who presented to the ED on 06/07/22 with left knee pain after fall.   RADS: CT: Transverse plane fracture of the patella with 2.5 cm of distraction.  Distal fragment appears comminuted.  Several punctate foci of gas concerning for open injury.  06/13/22: :   Open reduction internal fixation of left patella fracture  Excisional and nonexcisional Debridement irrigation open left patella fracture, subcutaneous tissue and bone   ED on 07/16/22 after fall when leg gave out.   RADS:  failed fixation with displacement of the inferior patellar fracture fragment and patella Marian of the proximal fracture fragment      Past Medical History:   Diagnosis Date    Hyperlipidemia     Hypertension     Thyroid disease      Past Surgical History:   Procedure Laterality Date    OPEN REDUCTION AND INTERNAL FIXATION (ORIF) OF FRACTURE OF PATELLA Left 6/13/2022    Procedure: ORIF, FRACTURE, PATELLA;  Surgeon: Al Anderson MD;  Location: [facility] OR [address] [place] [address];  Service: Orthopedics;  Laterality: Left;    TONSILLECTOMY       Social History     Occupational History    Not on file   Tobacco Use    Smoking status: Never Smoker    Smokeless tobacco: Never Used   Substance and Sexual Activity    Alcohol use: Not Currently    Drug use: Never    Sexual activity: Not Currently      ROS  Current Outpatient Medications on File Prior to Visit   Medication Sig Dispense Refill    acetaminophen (TYLENOL) 650 MG TbSR Take 1 tablet (650 mg total) by mouth every  "8 (eight) hours. 90 tablet 0    atorvastatin (LIPITOR) 20 MG tablet Take by mouth every evening.      cetirizine (ZYRTEC) 10 MG tablet Take by mouth nightly as needed.      chlorthalidone (HYGROTEN) 25 MG Tab Take 25 mg by mouth nightly.      docusate sodium (COLACE) 100 MG capsule Take 1 capsule (100 mg total) by mouth 2 (two) times daily as needed for Constipation. Take this medication with prescription pain medicine 60 capsule 0    fluticasone propionate (FLONASE) 50 mcg/actuation nasal spray SHAKE LQ AND U 2 SPRAYS IEN QD UTD      levothyroxine (SYNTHROID) 50 MCG tablet Take by mouth before breakfast.      loratadine (CLARITIN) 10 mg tablet Take 10 mg by mouth nightly as needed for Allergies.      metoprolol succinate (TOPROL-XL) 50 MG 24 hr tablet Take by mouth every evening.      omeprazole (PRILOSEC) 20 MG capsule Take by mouth every morning.      ondansetron (ZOFRAN-ODT) 4 MG TbDL Take 1 tablet (4 mg total) by mouth every 8 (eight) hours as needed (nausea or vomiting). 12 tablet 0    oxyCODONE (ROXICODONE) 5 MG immediate release tablet Take 1 tablet (5 mg total) by mouth every 6 (six) hours as needed for Pain. May take 1-2 tablets every 6 hours as needed for pain 20 tablet 0    sertraline (ZOLOFT) 50 MG tablet Take by mouth every evening.      aspirin (ECOTRIN) 81 MG EC tablet Take 81 mg by mouth 2 (two) times a day.       No current facility-administered medications on file prior to visit.     Review of patient's allergies indicates:   Allergen Reactions    Aspirin      "chest burns"  Low dose aspirin ok    Lisinopril Other (See Comments)     Coughing         Objective:      Vitals:    07/20/22 0924   BP: 101/64   BP Location: Right arm   Pulse: 82   Resp: 18   Weight: 72.7 kg (160 lb 4.4 oz)   Height: 5' (1.524 m)     Ortho Exam     Gen: WD, WN in NAD   HEENT: NC/AT   Heart: RR   Resp: unlabored breathing   Skin: intact, no lesions pertinent to the surgery site    Assessment:       1. 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 Age-related osteoporosis with current pathological fracture, initial encounter          Plan:       Surgical intervention per .

## 2022-07-21 NOTE — H&P (VIEW-ONLY)
Subjective:      Patient ID: Brunilda Bass is a 76 y.o. female.    Chief Complaint: Post-op Evaluation of the Left Knee    Principal Orthopedic Problem: Grade 1 open Left patella fracture     Relevant Medical History: according to chart    PMHX: HTN, hypothyroidism, and HPLD   They deny IV drug use.   They deny tobacco use.   They deny alcohol use.   They deny immunosuppressant medications.  They deny chemotherapy.  They deny radiation.      HPI: Ms. Bass is a 76 year old female who presented to the ED on 06/07/22 with left knee pain after fall.   RADS: CT: Transverse plane fracture of the patella with 2.5 cm of distraction.  Distal fragment appears comminuted.  Several punctate foci of gas concerning for open injury.  06/13/22: :   Open reduction internal fixation of left patella fracture  Excisional and nonexcisional Debridement irrigation open left patella fracture, subcutaneous tissue and bone   ED on 07/16/22 after fall when leg gave out.   RADS:  failed fixation with displacement of the inferior patellar fracture fragment and patella Marian of the proximal fracture fragment      Past Medical History:   Diagnosis Date    Hyperlipidemia     Hypertension     Thyroid disease      Past Surgical History:   Procedure Laterality Date    OPEN REDUCTION AND INTERNAL FIXATION (ORIF) OF FRACTURE OF PATELLA Left 6/13/2022    Procedure: ORIF, FRACTURE, PATELLA;  Surgeon: Al Anderson MD;  Location: [facility] OR [address] [place] [address];  Service: Orthopedics;  Laterality: Left;    TONSILLECTOMY       Social History     Occupational History    Not on file   Tobacco Use    Smoking status: Never Smoker    Smokeless tobacco: Never Used   Substance and Sexual Activity    Alcohol use: Not Currently    Drug use: Never    Sexual activity: Not Currently      ROS  Current Outpatient Medications on File Prior to Visit   Medication Sig Dispense Refill    acetaminophen (TYLENOL) 650 MG TbSR Take 1 tablet (650 mg total) by mouth every  "8 (eight) hours. 90 tablet 0    atorvastatin (LIPITOR) 20 MG tablet Take by mouth every evening.      cetirizine (ZYRTEC) 10 MG tablet Take by mouth nightly as needed.      chlorthalidone (HYGROTEN) 25 MG Tab Take 25 mg by mouth nightly.      docusate sodium (COLACE) 100 MG capsule Take 1 capsule (100 mg total) by mouth 2 (two) times daily as needed for Constipation. Take this medication with prescription pain medicine 60 capsule 0    fluticasone propionate (FLONASE) 50 mcg/actuation nasal spray SHAKE LQ AND U 2 SPRAYS IEN QD UTD      levothyroxine (SYNTHROID) 50 MCG tablet Take by mouth before breakfast.      loratadine (CLARITIN) 10 mg tablet Take 10 mg by mouth nightly as needed for Allergies.      metoprolol succinate (TOPROL-XL) 50 MG 24 hr tablet Take by mouth every evening.      omeprazole (PRILOSEC) 20 MG capsule Take by mouth every morning.      ondansetron (ZOFRAN-ODT) 4 MG TbDL Take 1 tablet (4 mg total) by mouth every 8 (eight) hours as needed (nausea or vomiting). 12 tablet 0    oxyCODONE (ROXICODONE) 5 MG immediate release tablet Take 1 tablet (5 mg total) by mouth every 6 (six) hours as needed for Pain. May take 1-2 tablets every 6 hours as needed for pain 20 tablet 0    sertraline (ZOLOFT) 50 MG tablet Take by mouth every evening.      aspirin (ECOTRIN) 81 MG EC tablet Take 81 mg by mouth 2 (two) times a day.       No current facility-administered medications on file prior to visit.     Review of patient's allergies indicates:   Allergen Reactions    Aspirin      "chest burns"  Low dose aspirin ok    Lisinopril Other (See Comments)     Coughing         Objective:      Vitals:    07/20/22 0924   BP: 101/64   BP Location: Right arm   Pulse: 82   Resp: 18   Weight: 72.7 kg (160 lb 4.4 oz)   Height: 5' (1.524 m)     Ortho Exam     Gen: WD, WN in NAD   HEENT: NC/AT   Heart: RR   Resp: unlabored breathing   Skin: intact, no lesions pertinent to the surgery site    Assessment:       1. 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 Age-related osteoporosis with current pathological fracture, initial encounter          Plan:       Surgical intervention per .

## 2022-07-22 ENCOUNTER — TELEPHONE (OUTPATIENT)
Dept: ORTHOPEDICS | Facility: CLINIC | Age: 77
End: 2022-07-22
Payer: MEDICARE

## 2022-07-22 ENCOUNTER — ANESTHESIA EVENT (OUTPATIENT)
Dept: SURGERY | Facility: HOSPITAL | Age: 77
End: 2022-07-22
Payer: MEDICARE

## 2022-07-22 NOTE — TELEPHONE ENCOUNTER
Spoke with pt   Advised NPO after midnight Sunday   Arrival 630 am Monday at [facility].   Pt verbalized understanding

## 2022-07-22 NOTE — PRE-PROCEDURE INSTRUCTIONS
PRE-OP INSTRUCTIONS:  Instructed to have nothing by mouth past midnight.  It is ok to take AM medications with a few sips of water.   Encouraged to wear loose fitting, comfortable clothing .  Medication instructions for pm prior to and am of surgery reviewed.  Instructed to avoid taking vitamins,supplements,aspirin or ibuprofen the am of surgery.    Patient denies any side effects or issues with anesthesia or sedation.

## 2022-07-24 NOTE — ANESTHESIA PREPROCEDURE EVALUATION
"                                                                                                [facility]  Anesthesia Pre-Operative Evaluation         Patient Name: Brunilda Bass  YOB: 1945  MRN: [id number]    SUBJECTIVE:     Pre-operative evaluation for Procedure(s) (LRB):  ORIF, FRACTURE, PATELLA, revision: (Left)  Scheduled for 7/25/2022    HPI 07/24/2022:  Brunilda Bass is a 76 y.o. female with hx of HTN, hypothyroidism, and HLD. She presented to the ED on 6/7/22 after a fall; found to have distal patellar fracture. She is s/p ORIF of L patellar fx on 6/13/22. She again presented to the ED on 7/16/22 after another fall. Imaging revealed failed fixation with displacement of inferior patellar fracture. Now presents for above procedure.    Prev airway:    Induction:  Intravenous    Intubated:  Postinduction    Mask Ventilation:  Easy mask    Attempts:  2    Attempted By:  Resident anesthesiologist    Method of Intubation:  Direct    Blade:  Yung 2    Laryngeal View Grade: Grade IIb - only the arytenoids and epiglottis seen      Attempted By (2nd Attempt):  Staff anesthesiologist    Method of Intubation (2nd Attempt):  Direct    Blade (2nd Attempt):  Yung 2    Laryngeal View Grade (2nd Attempt): Grade I - full view of cords      Difficult Airway Encountered?: No      Complications:  None    Airway Device:  Oral endotracheal tube    Airway Device Size:  7.0    Oxygen/Ventilation Requirements:  On room air.       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     Current Outpatient Medications on File Prior to Encounter   Medication Sig Dispense Refill    acetaminophen (TYLENOL) 650 MG TbSR Take 1 tablet (650 mg total) by mouth every 8 (eight) hours. 90 tablet 0    atorvastatin (LIPITOR) 20 MG tablet Take by mouth every evening.      levothyroxine (SYNTHROID) 50 MCG tablet Take by mouth before breakfast.      omeprazole (PRILOSEC) 20 MG capsule Take by mouth every morning.      aspirin (ECOTRIN) 81 MG EC tablet Take 81 mg by mouth 2 (two) times a day.      cetirizine (ZYRTEC) 10 MG tablet Take by mouth nightly as needed.      chlorthalidone (HYGROTEN) 25 MG Tab Take 25 mg by mouth nightly.      docusate sodium (COLACE) 100 MG capsule Take 1 capsule (100 mg total) by mouth 2 (two) times daily as needed for Constipation. Take this medication with prescription pain medicine 60 capsule 0    fluticasone propionate (FLONASE) 50 mcg/actuation nasal spray SHAKE LQ AND U 2 SPRAYS IEN QD UTD      loratadine (CLARITIN) 10 mg tablet Take 10 mg by mouth nightly as needed for Allergies.      metoprolol succinate (TOPROL-XL) 50 MG 24 hr tablet Take by mouth every evening.      ondansetron (ZOFRAN-ODT) 4 MG TbDL Take 1 tablet (4 mg total) by mouth every 8 (eight) hours as needed (nausea or vomiting). 12 tablet 0    oxyCODONE (ROXICODONE) 5 MG immediate release tablet Take 1 tablet (5 mg total) by mouth every 6 (six) hours as needed for Pain. May take 1-2 tablets every 6 hours as needed for pain 20 tablet 0    sertraline (ZOLOFT) 50 MG tablet Take by mouth every evening.          Current Inpatient Medications:      Past Surgical History:   Procedure Laterality Date    OPEN REDUCTION AND INTERNAL FIXATION (ORIF) OF FRACTURE OF PATELLA Left 6/13/2022    Procedure: ORIF, FRACTURE, PATELLA;  Surgeon: Al Anderson MD;  Location: [facility] OR [address] [place] [address];  Service: Orthopedics;  Laterality: Left;    TONSILLECTOMY         Social History     Socioeconomic  History    Marital status: Single   Tobacco Use    Smoking status: Never Smoker    Smokeless tobacco: Never Used   Substance and Sexual Activity    Alcohol use: Not Currently    Drug use: Never    Sexual activity: Not Currently       OBJECTIVE:     Weight:  Wt Readings from Last 1 Encounters:   07/20/22 72.7 kg (160 lb 4.4 oz)     There is no height or weight on file to calculate BMI.    Recent Blood Pressure Readings:  BP Readings from Last 3 Encounters:   07/20/22 101/64   07/16/22 112/65   06/27/22 104/60       Vital Signs Range (Last 24H):         CBC:   Lab Results   Component Value Date    WBC 15.73 (H) 06/07/2022    HGB 11.4 (L) 06/07/2022    HCT 33 (L) 07/16/2022    MCV 86 06/07/2022     06/07/2022       CMP:     Chemistry        Component Value Date/Time     (L) 06/07/2022 1419    K 4.3 06/07/2022 1419     06/07/2022 1419    CO2 23 06/07/2022 1419    BUN 18 06/07/2022 1419    CREATININE 1.5 (H) 06/07/2022 1419     (H) 06/07/2022 1419        Component Value Date/Time    CALCIUM 9.9 06/07/2022 1419    ALKPHOS 96 06/07/2022 1419    AST 17 06/07/2022 1419    ALT 13 06/07/2022 1419    BILITOT 0.5 06/07/2022 1419    ESTGFRAFRICA 38.5 (A) 06/07/2022 1419    EGFRNONAA 33.4 (A) 06/07/2022 1419            INR:  Lab Results   Component Value Date    INR 1.0 06/07/2022       EKG:    Results for orders placed or performed during the hospital encounter of 06/07/22   EKG 12-lead    Collection Time: 06/07/22  2:07 PM    Narrative    Test Reason : Z01.818,    Vent. Rate : 068 BPM     Atrial Rate : 068 BPM     P-R Int : 132 ms          QRS Dur : 108 ms      QT Int : 424 ms       P-R-T Axes : 099 038 011 degrees     QTc Int : 450 ms    Normal sinus rhythm  Incomplete right bundle branch block  Borderline Abnormal ECG  No previous ECGs available  Confirmed by Memo FLORES MD ([id number]) on 6/7/2022 2:57:22 PM    Referred By: [email or web address]   SELF           Confirmed By:Memo FLORES MD       2D Echo:    No  results found for this or any previous visit.      ASSESSMENT/PLAN:           Pre-op Assessment    I have reviewed the Patient Summary Reports.     I have reviewed the Nursing Notes. I have reviewed the NPO Status.   I have reviewed the Medications.     Review of Systems  Cardiovascular:   Hypertension    Pulmonary:   Denies COPD.    Renal/:   Denies Chronic Renal Disease.     Hepatic/GI:   Denies GERD.    Endocrine:   Hypothyroidism    Psych:   Denies Psychiatric History.          Physical Exam  General: Well nourished, Cooperative, Alert and Oriented    Airway:  Mallampati: II / II  Mouth Opening: Normal  TM Distance: Normal  Tongue: Normal  Neck ROM: Normal ROM    Dental:        Anesthesia Plan  Type of Anesthesia, risks & benefits discussed:    Anesthesia Type: Gen ETT, Gen Supraglottic Airway, Gen Natural Airway  Intra-op Monitoring Plan: Standard ASA Monitors  Post Op Pain Control Plan: multimodal analgesia, IV/PO Opioids PRN and peripheral nerve block  Induction:  IV  Airway Plan: Direct and Video, Post-Induction  Informed Consent: Informed consent signed with the Patient and all parties understand the risks and agree with anesthesia plan.  All questions answered.   ASA Score: 2  Day of Surgery Review of History & Physical: H&P Update referred to the surgeon/provider.    Ready For Surgery From Anesthesia Perspective.     .

## 2022-07-25 ENCOUNTER — HOSPITAL ENCOUNTER (OUTPATIENT)
Facility: HOSPITAL | Age: 77
Discharge: HOME | End: 2022-07-25
Attending: ORTHOPAEDIC SURGERY | Admitting: ORTHOPAEDIC SURGERY
Payer: MEDICARE

## 2022-07-25 ENCOUNTER — ANESTHESIA (OUTPATIENT)
Dept: SURGERY | Facility: HOSPITAL | Age: 77
End: 2022-07-25
Payer: MEDICARE

## 2022-07-25 VITALS
OXYGEN SATURATION: 98 % | TEMPERATURE: 99 F | RESPIRATION RATE: 18 BRPM | HEART RATE: 74 BPM | DIASTOLIC BLOOD PRESSURE: 60 MMHG | HEIGHT: 60 IN | SYSTOLIC BLOOD PRESSURE: 123 MMHG | WEIGHT: 160 LBS | BODY MASS INDEX: 31.41 KG/M2

## 2022-07-25 DIAGNOSIS — S82.032M: ICD-10-CM

## 2022-07-25 DIAGNOSIS — T84.498A FAILED ORTHOPEDIC IMPLANT, INITIAL ENCOUNTER: Primary | ICD-10-CM

## 2022-07-25 PROCEDURE — 36000710: Performed by: ORTHOPAEDIC SURGERY

## 2022-07-25 PROCEDURE — 71000015 HC POSTOP RECOV 1ST HR: Performed by: ORTHOPAEDIC SURGERY

## 2022-07-25 PROCEDURE — 37000009 HC ANESTHESIA EA ADD 15 MINS: Performed by: ORTHOPAEDIC SURGERY

## 2022-07-25 PROCEDURE — 63600175 PHARM REV CODE 636 W HCPCS: Performed by: STUDENT IN AN ORGANIZED HEALTH CARE EDUCATION/TRAINING PROGRAM

## 2022-07-25 PROCEDURE — D9220A PRA ANESTHESIA: Mod: ,,, | Performed by: STUDENT IN AN ORGANIZED HEALTH CARE EDUCATION/TRAINING PROGRAM

## 2022-07-25 PROCEDURE — 64448 NJX AA&/STRD FEM NRV NFS IMG: CPT | Performed by: ANESTHESIOLOGY

## 2022-07-25 PROCEDURE — C1713 ANCHOR/SCREW BN/BN,TIS/BN: HCPCS | Performed by: ORTHOPAEDIC SURGERY

## 2022-07-25 PROCEDURE — 27524 TREAT KNEECAP FRACTURE: CPT | Mod: 79,22,LT,GC | Performed by: ORTHOPAEDIC SURGERY

## 2022-07-25 PROCEDURE — 64448 LEFT FEMORAL CATHETER: ICD-10-PCS | Mod: 59,LT,, | Performed by: ANESTHESIOLOGY

## 2022-07-25 PROCEDURE — 71000044 HC DOSC ROUTINE RECOVERY FIRST HOUR: Performed by: ORTHOPAEDIC SURGERY

## 2022-07-25 PROCEDURE — 25000003 PHARM REV CODE 250: Performed by: STUDENT IN AN ORGANIZED HEALTH CARE EDUCATION/TRAINING PROGRAM

## 2022-07-25 PROCEDURE — 63600175 PHARM REV CODE 636 W HCPCS: Performed by: ANESTHESIOLOGY

## 2022-07-25 PROCEDURE — 27524 PR OPEN RX PATELLA FX: ICD-10-PCS | Mod: 79,22,LT,GC | Performed by: ORTHOPAEDIC SURGERY

## 2022-07-25 PROCEDURE — 76942 ECHO GUIDE FOR BIOPSY: CPT | Mod: 26,,, | Performed by: ANESTHESIOLOGY

## 2022-07-25 PROCEDURE — 63600175 PHARM REV CODE 636 W HCPCS: Performed by: ORTHOPAEDIC SURGERY

## 2022-07-25 PROCEDURE — 76942 LEFT FEMORAL CATHETER: ICD-10-PCS | Mod: 26,,, | Performed by: ANESTHESIOLOGY

## 2022-07-25 PROCEDURE — D9220A PRA ANESTHESIA: ICD-10-PCS | Mod: ,,, | Performed by: STUDENT IN AN ORGANIZED HEALTH CARE EDUCATION/TRAINING PROGRAM

## 2022-07-25 PROCEDURE — 37000008 HC ANESTHESIA 1ST 15 MINUTES: Performed by: ORTHOPAEDIC SURGERY

## 2022-07-25 PROCEDURE — 36000711: Performed by: ORTHOPAEDIC SURGERY

## 2022-07-25 PROCEDURE — 27201423 OPTIME MED/SURG SUP & DEVICES STERILE SUPPLY: Performed by: ORTHOPAEDIC SURGERY

## 2022-07-25 PROCEDURE — C1769 GUIDE WIRE: HCPCS | Performed by: ORTHOPAEDIC SURGERY

## 2022-07-25 DEVICE — SCREW 2.7MM X 18 LOCKING ANGLE: Type: IMPLANTABLE DEVICE | Site: PATELLA | Status: FUNCTIONAL

## 2022-07-25 DEVICE — IMPLANTABLE DEVICE: Type: IMPLANTABLE DEVICE | Site: PATELLA | Status: FUNCTIONAL

## 2022-07-25 DEVICE — SCREW 2.7MM X 14 LOCKING ANGLE: Type: IMPLANTABLE DEVICE | Site: PATELLA | Status: FUNCTIONAL

## 2022-07-25 DEVICE — SCREW VA LOK ST T8 2.7X36MM: Type: IMPLANTABLE DEVICE | Site: PATELLA | Status: FUNCTIONAL

## 2022-07-25 DEVICE — SCREW BONE LOCK 2.7X10MM: Type: IMPLANTABLE DEVICE | Site: PATELLA | Status: FUNCTIONAL

## 2022-07-25 RX ORDER — ONDANSETRON 2 MG/ML
INJECTION INTRAMUSCULAR; INTRAVENOUS
Status: DISCONTINUED | OUTPATIENT
Start: 2022-07-25 | End: 2022-07-25

## 2022-07-25 RX ORDER — VANCOMYCIN HYDROCHLORIDE 1 G/20ML
INJECTION, POWDER, LYOPHILIZED, FOR SOLUTION INTRAVENOUS
Status: DISCONTINUED | OUTPATIENT
Start: 2022-07-25 | End: 2022-07-25 | Stop reason: HOSPADM

## 2022-07-25 RX ORDER — OXYCODONE HYDROCHLORIDE 5 MG/1
10 TABLET ORAL EVERY 4 HOURS PRN
Status: CANCELLED | OUTPATIENT
Start: 2022-07-25

## 2022-07-25 RX ORDER — ROCURONIUM BROMIDE 10 MG/ML
INJECTION, SOLUTION INTRAVENOUS
Status: DISCONTINUED | OUTPATIENT
Start: 2022-07-25 | End: 2022-07-25

## 2022-07-25 RX ORDER — ACETAMINOPHEN 500 MG
1000 TABLET ORAL EVERY 6 HOURS
Status: CANCELLED | OUTPATIENT
Start: 2022-07-25 | End: 2022-07-27

## 2022-07-25 RX ORDER — ONDANSETRON 8 MG/1
8 TABLET, ORALLY DISINTEGRATING ORAL EVERY 8 HOURS PRN
Status: DISCONTINUED | OUTPATIENT
Start: 2022-07-25 | End: 2022-07-25 | Stop reason: HOSPADM

## 2022-07-25 RX ORDER — LIDOCAINE HYDROCHLORIDE 20 MG/ML
INJECTION, SOLUTION EPIDURAL; INFILTRATION; INTRACAUDAL; PERINEURAL
Status: DISCONTINUED | OUTPATIENT
Start: 2022-07-25 | End: 2022-07-25

## 2022-07-25 RX ORDER — CEFAZOLIN SODIUM 1 G/3ML
INJECTION, POWDER, FOR SOLUTION INTRAMUSCULAR; INTRAVENOUS
Status: DISCONTINUED | OUTPATIENT
Start: 2022-07-25 | End: 2022-07-25

## 2022-07-25 RX ORDER — AMOXICILLIN 250 MG
1 CAPSULE ORAL DAILY PRN
OUTPATIENT
Start: 2022-07-25

## 2022-07-25 RX ORDER — OXYCODONE HYDROCHLORIDE 5 MG/1
5 TABLET ORAL EVERY 4 HOURS PRN
Status: CANCELLED | OUTPATIENT
Start: 2022-07-25

## 2022-07-25 RX ORDER — ROPIVACAINE HYDROCHLORIDE 2 MG/ML
0.1 INJECTION, SOLUTION EPIDURAL; INFILTRATION; PERINEURAL CONTINUOUS
Status: CANCELLED | OUTPATIENT
Start: 2022-07-25

## 2022-07-25 RX ORDER — ASPIRIN 81 MG/1
81 TABLET ORAL 2 TIMES DAILY
Qty: 60 TABLET | Refills: 0 | Status: SHIPPED | OUTPATIENT
Start: 2022-07-25 | End: 2022-08-24

## 2022-07-25 RX ORDER — CEFAZOLIN SODIUM/WATER 2 G/20 ML
2 SYRINGE (ML) INTRAVENOUS
Status: DISCONTINUED | OUTPATIENT
Start: 2022-07-25 | End: 2022-07-25 | Stop reason: HOSPADM

## 2022-07-25 RX ORDER — FENTANYL CITRATE 50 UG/ML
25-200 INJECTION, SOLUTION INTRAMUSCULAR; INTRAVENOUS
Status: ACTIVE | OUTPATIENT
Start: 2022-07-25

## 2022-07-25 RX ORDER — METHOCARBAMOL 500 MG/1
500 TABLET, FILM COATED ORAL 4 TIMES DAILY
Status: CANCELLED | OUTPATIENT
Start: 2022-07-25

## 2022-07-25 RX ORDER — SULFAMETHOXAZOLE AND TRIMETHOPRIM 800; 160 MG/1; MG/1
1 TABLET ORAL 2 TIMES DAILY
Qty: 28 TABLET | Refills: 0 | Status: SHIPPED | OUTPATIENT
Start: 2022-07-25 | End: 2022-08-08

## 2022-07-25 RX ORDER — ROPIVACAINE HYDROCHLORIDE 5 MG/ML
INJECTION, SOLUTION EPIDURAL; INFILTRATION; PERINEURAL
Status: COMPLETED | OUTPATIENT
Start: 2022-07-25 | End: 2022-07-25

## 2022-07-25 RX ORDER — HYDROCODONE BITARTRATE AND ACETAMINOPHEN 10; 325 MG/1; MG/1
1 TABLET ORAL EVERY 4 HOURS PRN
Status: DISCONTINUED | OUTPATIENT
Start: 2022-07-25 | End: 2022-07-25 | Stop reason: HOSPADM

## 2022-07-25 RX ORDER — CLINDAMYCIN PHOSPHATE 900 MG/50ML
900 INJECTION, SOLUTION INTRAVENOUS
Status: DISCONTINUED | OUTPATIENT
Start: 2022-07-25 | End: 2022-07-25 | Stop reason: HOSPADM

## 2022-07-25 RX ORDER — MIDAZOLAM HYDROCHLORIDE 1 MG/ML
.5-4 INJECTION INTRAMUSCULAR; INTRAVENOUS
Status: ACTIVE | OUTPATIENT
Start: 2022-07-25

## 2022-07-25 RX ORDER — PHENYLEPHRINE HCL IN 0.9% NACL 1 MG/10 ML
SYRINGE (ML) INTRAVENOUS
Status: DISCONTINUED | OUTPATIENT
Start: 2022-07-25 | End: 2022-07-25

## 2022-07-25 RX ORDER — NEOSTIGMINE METHYLSULFATE 0.5 MG/ML
INJECTION, SOLUTION INTRAVENOUS
Status: DISCONTINUED | OUTPATIENT
Start: 2022-07-25 | End: 2022-07-25

## 2022-07-25 RX ORDER — FENTANYL CITRATE 50 UG/ML
INJECTION, SOLUTION INTRAMUSCULAR; INTRAVENOUS
Status: DISCONTINUED | OUTPATIENT
Start: 2022-07-25 | End: 2022-07-25

## 2022-07-25 RX ORDER — MUPIROCIN 20 MG/G
OINTMENT TOPICAL
Status: DISCONTINUED | OUTPATIENT
Start: 2022-07-25 | End: 2022-07-25 | Stop reason: HOSPADM

## 2022-07-25 RX ORDER — DEXAMETHASONE SODIUM PHOSPHATE 4 MG/ML
INJECTION, SOLUTION INTRA-ARTICULAR; INTRALESIONAL; INTRAMUSCULAR; INTRAVENOUS; SOFT TISSUE
Status: DISCONTINUED | OUTPATIENT
Start: 2022-07-25 | End: 2022-07-25

## 2022-07-25 RX ORDER — METHOCARBAMOL 750 MG/1
750 TABLET, FILM COATED ORAL 3 TIMES DAILY
Qty: 45 TABLET | Refills: 0 | Status: SHIPPED | OUTPATIENT
Start: 2022-07-25 | End: 2022-08-09

## 2022-07-25 RX ORDER — OXYCODONE HYDROCHLORIDE 5 MG/1
5 TABLET ORAL EVERY 6 HOURS PRN
Qty: 30 TABLET | Refills: 0 | Status: SHIPPED | OUTPATIENT
Start: 2022-07-25

## 2022-07-25 RX ORDER — HYDROCODONE BITARTRATE AND ACETAMINOPHEN 5; 325 MG/1; MG/1
1 TABLET ORAL EVERY 4 HOURS PRN
Status: DISCONTINUED | OUTPATIENT
Start: 2022-07-25 | End: 2022-07-25 | Stop reason: HOSPADM

## 2022-07-25 RX ORDER — DEXTROMETHORPHAN HYDROBROMIDE, GUAIFENESIN 5; 100 MG/5ML; MG/5ML
650 LIQUID ORAL EVERY 8 HOURS
Qty: 90 TABLET | Refills: 0 | Status: SHIPPED | OUTPATIENT
Start: 2022-07-25 | End: 2022-08-26

## 2022-07-25 RX ORDER — EPHEDRINE SULFATE 50 MG/ML
INJECTION, SOLUTION INTRAVENOUS
Status: DISCONTINUED | OUTPATIENT
Start: 2022-07-25 | End: 2022-07-25

## 2022-07-25 RX ORDER — HALOPERIDOL 5 MG/ML
0.5 INJECTION INTRAMUSCULAR EVERY 10 MIN PRN
Status: DISCONTINUED | OUTPATIENT
Start: 2022-07-25 | End: 2022-07-25 | Stop reason: HOSPADM

## 2022-07-25 RX ORDER — PROPOFOL 10 MG/ML
VIAL (ML) INTRAVENOUS
Status: DISCONTINUED | OUTPATIENT
Start: 2022-07-25 | End: 2022-07-25

## 2022-07-25 RX ORDER — HYDROMORPHONE HYDROCHLORIDE 1 MG/ML
0.2 INJECTION, SOLUTION INTRAMUSCULAR; INTRAVENOUS; SUBCUTANEOUS EVERY 5 MIN PRN
Status: DISCONTINUED | OUTPATIENT
Start: 2022-07-25 | End: 2022-07-25 | Stop reason: HOSPADM

## 2022-07-25 RX ORDER — CLINDAMYCIN PHOSPHATE 900 MG/50ML
INJECTION, SOLUTION INTRAVENOUS
Status: DISCONTINUED | OUTPATIENT
Start: 2022-07-25 | End: 2022-07-25

## 2022-07-25 RX ADMIN — NEOSTIGMINE METHYLSULFATE 4 MG: 0.5 INJECTION, SOLUTION INTRAVENOUS at 01:07

## 2022-07-25 RX ADMIN — FENTANYL CITRATE 50 MCG: 50 INJECTION INTRAMUSCULAR; INTRAVENOUS at 12:07

## 2022-07-25 RX ADMIN — EPHEDRINE SULFATE 10 MG: 50 INJECTION INTRAVENOUS at 12:07

## 2022-07-25 RX ADMIN — Medication 200 MCG: at 10:07

## 2022-07-25 RX ADMIN — CLINDAMYCIN PHOSPHATE 900 MG: 18 INJECTION, SOLUTION INTRAVENOUS at 10:07

## 2022-07-25 RX ADMIN — PROPOFOL 150 MG: 10 INJECTION, EMULSION INTRAVENOUS at 09:07

## 2022-07-25 RX ADMIN — CEFAZOLIN 2 G: 330 INJECTION, POWDER, FOR SOLUTION INTRAMUSCULAR; INTRAVENOUS at 10:07

## 2022-07-25 RX ADMIN — MIDAZOLAM 2 MG: 1 INJECTION INTRAMUSCULAR; INTRAVENOUS at 09:07

## 2022-07-25 RX ADMIN — GLYCOPYRROLATE 0.6 MG: 0.2 INJECTION, SOLUTION INTRAMUSCULAR; INTRAVENOUS at 12:07

## 2022-07-25 RX ADMIN — Medication 100 MCG: at 10:07

## 2022-07-25 RX ADMIN — EPHEDRINE SULFATE 15 MG: 50 INJECTION INTRAVENOUS at 10:07

## 2022-07-25 RX ADMIN — Medication 300 MCG: at 10:07

## 2022-07-25 RX ADMIN — FENTANYL CITRATE 50 MCG: 50 INJECTION INTRAMUSCULAR; INTRAVENOUS at 09:07

## 2022-07-25 RX ADMIN — ROCURONIUM BROMIDE 50 MG: 10 INJECTION INTRAVENOUS at 09:07

## 2022-07-25 RX ADMIN — EPHEDRINE SULFATE 5 MG: 50 INJECTION INTRAVENOUS at 12:07

## 2022-07-25 RX ADMIN — SODIUM CHLORIDE, SODIUM GLUCONATE, SODIUM ACETATE, POTASSIUM CHLORIDE, MAGNESIUM CHLORIDE, SODIUM PHOSPHATE, DIBASIC, AND POTASSIUM PHOSPHATE: .53; .5; .37; .037; .03; .012; .00082 INJECTION, SOLUTION INTRAVENOUS at 11:07

## 2022-07-25 RX ADMIN — PROPOFOL 30 MG: 10 INJECTION, EMULSION INTRAVENOUS at 01:07

## 2022-07-25 RX ADMIN — SODIUM CHLORIDE 0.2 MCG/KG/MIN: 9 INJECTION, SOLUTION INTRAVENOUS at 10:07

## 2022-07-25 RX ADMIN — SODIUM CHLORIDE: 0.9 INJECTION, SOLUTION INTRAVENOUS at 09:07

## 2022-07-25 RX ADMIN — LIDOCAINE HYDROCHLORIDE 70 MG: 20 INJECTION, SOLUTION EPIDURAL; INFILTRATION; INTRACAUDAL; PERINEURAL at 09:07

## 2022-07-25 RX ADMIN — ROPIVACAINE HYDROCHLORIDE 10 ML: 5 INJECTION, SOLUTION EPIDURAL; INFILTRATION; PERINEURAL at 09:07

## 2022-07-25 RX ADMIN — DEXAMETHASONE SODIUM PHOSPHATE 4 MG: 4 INJECTION INTRA-ARTICULAR; INTRALESIONAL; INTRAMUSCULAR; INTRAVENOUS; SOFT TISSUE at 09:07

## 2022-07-25 RX ADMIN — ONDANSETRON 4 MG: 2 INJECTION INTRAMUSCULAR; INTRAVENOUS at 12:07

## 2022-07-25 NOTE — ANESTHESIA PROCEDURE NOTES
Left Femoral Catheter    Patient location during procedure: pre-op   Block not for primary anesthetic.  Reason for block: at surgeon's request and post-op pain management   Post-op Pain Location: Left knee pain   Start time: 7/25/2022 9:05 AM  Timeout: 7/25/2022 9:03 AM   End time: 7/25/2022 9:25 AM    Staffing  Authorizing Provider: Mary Nguyen MD  Performing Provider: Mary Nguyen MD    Preanesthetic Checklist  Completed: patient identified, IV checked, site marked, risks and benefits discussed, surgical consent, monitors and equipment checked, pre-op evaluation and timeout performed  Peripheral Block  Patient position: supine  Prep: ChloraPrep and site prepped and draped  Patient monitoring: heart rate, cardiac monitor, continuous pulse ox, continuous capnometry and frequent blood pressure checks  Block type: femoral  Laterality: left  Injection technique: continuous  Needle  Needle type: Tuohy   Needle gauge: 17 G  Needle length: 3.5 in  Needle localization: anatomical landmarks and ultrasound guidance  Catheter type: spring wound  Catheter size: 19 G  Test dose: lidocaine 1.5% with Epi 1-to-200,000 and negative   -ultrasound image captured on disc.  Assessment  Injection assessment: negative aspiration, negative parasthesia and local visualized surrounding nerve  Paresthesia pain: none  Heart rate change: no  Slow fractionated injection: yes  Pain Tolerance: comfortable throughout block and no complaints  Medications:    Medications: ropivacaine (NAROPIN) injection 0.5% - Perineural   10 mL - 7/25/2022 9:20:00 AM    Additional Notes  Left femoral block performed and perineural catheter placed under ultrasound guidance, while maintaining sterile technique throughout procedure.  Administered 20cc of 0.25% ropivacaine with epi 1:300k   5 cc of lido/epi test dose administered following catheter placement.  Negative aspiration and visualization of local spread confirmed with ultrasound.   VSS. Patient  tolerated well. No immediate complications.  [facility] RN present and monitored throughout procedure. See RN flowsheet.

## 2022-07-25 NOTE — DISCHARGE INSTRUCTIONS
Leave wound vacuum dressing in place and turned on until you follow up with Dr. Anderson's office in one week. Call Dr. Anderson's office with any problems with dressing or vacuum machine.

## 2022-07-25 NOTE — PROGRESS NOTES
Per Dr. SARAH Scanlon patient to leave wound vac on for one week then come to office to have it removed.   Also let him know patient still wants to go home today not be admitted over night.

## 2022-07-25 NOTE — TRANSFER OF CARE
Anesthesia Transfer of Care Note    Patient: Brunilda Bass    Procedure(s) Performed: Procedure(s) (LRB):  ORIF, FRACTURE, PATELLA, revision: (Left)    Patient location: PACU    Anesthesia Type: general and regional    Transport from OR: Transported from OR on 6-10 L/min O2 by face mask with adequate spontaneous ventilation    Post pain: adequate analgesia    Post assessment: no apparent anesthetic complications    Post vital signs: stable    Level of consciousness: awake and alert    Nausea/Vomiting: no nausea/vomiting    Complications: none    Transfer of care protocol was followed      Last vitals:   Visit Vitals  /73 (BP Location: Left arm, Patient Position: Lying)   Pulse 76   Temp 37.2 °C (98.9 °F) (Temporal)   Resp 15   Ht 5' (1.524 m)   Wt 72.6 kg (160 lb)   SpO2 100%   Breastfeeding No   BMI 31.25 kg/m²

## 2022-07-25 NOTE — OP NOTE
OP NOTE    DOS:  07/25/2022    Preop Dx: Left patella fracture with failed fixation after fall from standing    Postop Dx: Left patella fracture with failed fixation after fall from standing    Extensive scar anterior left knee    Procedure: Revision fixation complex left patella fracture    Surgeon: Al Anderson M.D.    Asst:  Chaitanya Scanlon M.D    Anesthesia: GETA plus regional    EBL:  20cc    IVF:  1500cc crystalloid    Implants: Synthes patellar plate and screws. #5 Fiberwire cerclage    Specimens: None    Findings: Pulled through inferior pole bone.  Plate fixation with stable cerclage    Dispo:  To PACU extubated/stable       Indications for Procedure:      Patient is a 76-year-old female who underwent ORIF of a left patella fracture on 06/13/2022.  Patient was just beginning to do some range-of-motion exercises but still ambulating in a knee immobilizer.  She again fell from standing during her postoperative rehabilitation course after which had no function of her extensor mechanism.  Repeat radiographs showed that she had pulled through with all the fixation remaining in the superior pole which was now again displaced from the inferior pole.  I bring her back to the operating room for revision ORIF with cerclage augmentation.  I explained to her that I will likely need to use plate screw fixation in this setting.  The risks, benefits and alternatives to surgery discussed at length prior to going to the operating room.  Informed consent was obtained.    I am pending a 0.22 modifier to this case given the complexity of the revision and the extra time work involved.  Revision in the extra time and work involved in the fixation.  The fixation failure was due to a recurrent fall and new trauma.    Procedure in Detail:    Patient was identified the preoperative holding area and the site was marked.  Regional analgesia was administered.  Patient was wheeled to the operating room and placed on the operating  table in a supine position.  General endotracheal anesthesia induced and preoperative antibiotics were administered to include Ancef and clindamycin.  The left lower extremity was placed into a nonsterile tourniquet and then prepped and draped in sterile fashion.  A time-out was undertaken to confirm patient, side, site, surgery, surgeon and the administration of preoperative antibiotics.  All agreed we proceeded.  The leg was exsanguinated and the tourniquet was raised.    The patient had a small area of scab at the distal aspect of the incision.  I drew an ellipse around this.  I excised through skin and subcutaneous tissue around this removing that small paddle of full-thickness skin and subcutaneous tissue, and then completed the incision through its prior course.  I developed full-thickness flaps medial and lateral.  The patient had a good deal scar directly down to the inferior pole of patella.  I elevated the scar off of the inferior pole and then continued my flaps mobilizing the tissue for closure at the end of the case.  I then took the knee through range of motion.    I Identified the proximal and distal portions of the patella.  The retinaculum medially and laterally had retorn.  I began by curetting the inferior pole of the patella.  There had been some comminution in the inferior pole but it was well and felt in soft tissue.  I got back to a bleeding base in this area.  I then cut the cable from the initial surgery off of the superior portion.  I then removed the 2 screws.  I then curetted the superior portion back to a healthy bleeding base.  I used a 10 blade to excise some scar tissue from around the fracture site.    At this point I had to mobilize the superior fragment by taking a Freddie underneath the vastus intermedius off of the front of the femur to mobilize the extensor mechanism distally.  I placed a series of clamps to get the best possible apposition of the joint surface.  I felt  underneath the retinaculum to confirm this.  I placed several K-wires to hold this in position.  I selected a template for the placed see which would be the best spanning plate as I did not feel that repeat screw and cable fixation would be adequate given the fact that she had already pulled the prior screws and cable all the way through the inferior bone.    I bent the inferior times in the plate to wrap around the inferior pole of patella.  I made several small cuts in the patellar tendon to accommodate this.  I bent the superior tines around the superior pole making several small cuts in the quadriceps tendon to a comminuted this.  I selected my plate and made match the template.  I placed all of wires in the holes eccentric lead to compress the plate down on the bone.  I then used the most distal central hole in the plate to place a bicortical screw all the way across the superior pole.  I then placed another cortical screw going from superior to inferior and then another locking screw going from superior to inferior.  I then placed a locking screw in inferolateral screw hole.  The screw holes on the side of the plate were aligned with the fracture line and not useful for screw fixation but because I bent those around the acted as a good buttress around the patella.  Then placed 2 superficial and deep screws stopping short of the joint surface.  Visualized the entire construct under fluoroscopy and had good apposition and good plate placement.    At this point I repaired the retinaculum both medial and lateral with 1. Vicryl suture in interrupted figure-of-eight fashion.  I then used a 5. FiberWire suture and weaved this in and out of the quadriceps tendon, through the medial retinaculum, through the patellar tendon, around the lateral retinaculum, and back to the quadriceps tendon where I tied the suture in cerclage fashion.    The tourniquet was let down hemostasis obtained electrocautery.  The wound was  copiously irrigated with normal saline solution.  The fascia was closed 0 Vicryl suture over 1 g vancomycin and 2 g cefepime powder.  The subcutaneous tissue was closed with 3-0 Vicryl suture and the skin with 3-0 strata fix and Dermabond followed by staples and then an incisional wound VAC to promote wound healing.    All instrument and sponge counts were reported correct at the end of the case.  There were no complications.  The patient was extubated, awakened and taken to the recovery room in stable condition.    Plan the patient:    I will give her Bactrim to take for 1 week given the revision surgery.  I placed her into a knee immobilizer rather than the hinged knee brace for now as she was having trouble keeping the hinged knee brace up and I felt the immobilizer would be easier to use.  She will remain weight-bearing as tolerated but in full extension around the clock.  Multimodal pain management.  81 mg aspirin b.i.d..  We will begin gentle range of motion at 4 weeks postop.  X-rays at follow-up visits.    Al Anderson MD

## 2022-07-25 NOTE — INTERVAL H&P NOTE
No change to H&P.  Status post ORIF of left patella fracture on 06/13/2022.  Patient had another fall on 07/16/2022 and presented to the emergency department with an extensor lag and fixation failure.  I am taking her to the operating room for revision fixation.  The risks, benefits and alternatives to surgery were discussed with the patient at great length.  These include bleeding, infection, vessel/nerve damage, pain, numbness, stiffness, tingling, complex regional pain syndrome, hardware/surgical failure, need for further surgery, malunion, nonunion, DVT, PE, arthritis and death.  Patient states an understanding and wishes to proceed with surgery.   All questions were answered.  No guarantees were implied or stated.  Informed consent was obtained.          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

## 2022-07-25 NOTE — DISCHARGE SUMMARY
[facility] (2nd Fl)  Discharge Note  Short Stay    Procedure(s) (LRB):  ORIF, FRACTURE, PATELLA, revision: (Left)    OUTCOME: Patient tolerated treatment/procedure well without complication and is now ready for discharge.    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    FOLLOWUP: In clinic    DISCHARGE INSTRUCTIONS:    Discharge Procedure Orders   Diet general     Call MD for:  temperature >100.4     Call MD for:  persistent nausea and vomiting     Call MD for:  severe uncontrolled pain     Call MD for:  difficulty breathing, headache or visual disturbances     Call MD for:  redness, tenderness, or signs of infection (pain, swelling, redness, odor or green/yellow discharge around incision site)     Call MD for:  hives     Call MD for:  persistent dizziness or light-headedness     Call MD for:  extreme fatigue     No driving, operating heavy equipment or signing legal documents while taking pain medication     Keep surgical extremity elevated     Leave dressing on - Keep it clean, dry, and intact until clinic visit     Weight bearing as tolerated         Medication List      START taking these medications    methocarbamoL 750 MG Tab  Commonly known as: ROBAXIN  Take 1 tablet (750 mg total) by mouth 3 (three) times daily. for 15 days     sulfamethoxazole-trimethoprim 800-160mg 800-160 mg Tab  Commonly known as: BACTRIM DS  Take 1 tablet by mouth 2 (two) times daily. for 14 days        CONTINUE taking these medications    acetaminophen 650 MG Tbsr  Commonly known as: TYLENOL  Take 1 tablet (650 mg total) by mouth every 8 (eight) hours.     aspirin 81 MG EC tablet  Commonly known as: ECOTRIN  Take 1 tablet (81 mg total) by mouth 2 (two) times a day.     atorvastatin 20 MG tablet  Commonly known as: LIPITOR     cetirizine 10 MG tablet  Commonly known as: ZYRTEC     chlorthalidone 25 MG  Tab  Commonly known as: HYGROTEN     docusate sodium 100 MG capsule  Commonly known as: COLACE  Take 1 capsule (100 mg total) by mouth 2 (two) times daily as needed for Constipation. Take this medication with prescription pain medicine     ergocalciferol 50,000 unit Cap  Commonly known as: ERGOCALCIFEROL  Take 1 capsule (50,000 Units total) by mouth every 30 days.     fluticasone propionate 50 mcg/actuation nasal spray  Commonly known as: FLONASE     levothyroxine 50 MCG tablet  Commonly known as: SYNTHROID     loratadine 10 mg tablet  Commonly known as: CLARITIN     metoprolol succinate 50 MG 24 hr tablet  Commonly known as: TOPROL-XL     omeprazole 20 MG capsule  Commonly known as: PRILOSEC     ondansetron 4 MG Tbdl  Commonly known as: ZOFRAN-ODT  Take 1 tablet (4 mg total) by mouth every 8 (eight) hours as needed (nausea or vomiting).     oxyCODONE 5 MG immediate release tablet  Commonly known as: ROXICODONE  Take 1 tablet (5 mg total) by mouth every 6 (six) hours as needed for Pain. May take 1-2 tablets every 6 hours as needed for pain     sertraline 50 MG tablet  Commonly known as: ZOLOFT           Where to Get Your Medications      [facility] [address]    Hours: Mon-Fri 7a-7p, Sat-Sun 10a-4p Phone: [phone]   · acetaminophen 650 MG Tbsr  · aspirin 81 MG EC tablet  · methocarbamoL 750 MG Tab  · oxyCODONE 5 MG immediate release tablet  · sulfamethoxazole-trimethoprim 800-160mg 800-160 mg Tab             TIME SPENT ON DISCHARGE: 15 minutes

## 2022-07-25 NOTE — ANESTHESIA PROCEDURE NOTES
Intubation    Date/Time: 7/25/2022 10:04 AM  Performed by: Kelly Mcneal DO  Authorized by: Kelly Mcneal DO     Intubation:     Induction:  Intravenous    Intubated:  Postinduction    Mask Ventilation:  Easy mask    Attempts:  2    Attempted By:  Resident anesthesiologist    Method of Intubation:  Direct    Blade:  Yugn 2    Laryngeal View Grade: Grade IIb - only the arytenoids and epiglottis seen      Attempted By (2nd Attempt):  Resident anesthesiologist    Method of Intubation (2nd Attempt):  Direct and bougie    Blade (2nd Attempt):  Tawanna 4    Laryngeal View Grade (2nd Attempt): Grade IIb - only the arytenoids and epiglottis seen      Difficult Airway Encountered?: No      Complications:  None    Airway Device:  Oral endotracheal tube    Airway Device Size:  7.0    Style/Cuff Inflation:  Cuffed (inflated to minimal occlusive pressure)    Tube secured:  23    Secured at:  The lips    Placement Verified By:  Capnometry    Complicating Factors:  Anterior larynx    Findings Post-Intubation:  BS equal bilateral and atraumatic/condition of teeth unchanged

## 2022-07-26 NOTE — ANESTHESIA POSTPROCEDURE EVALUATION
Anesthesia Post Evaluation    Patient: Brunilda Bass    Procedure(s) Performed: Procedure(s) (LRB):  ORIF, FRACTURE, PATELLA, revision: (Left)    Final Anesthesia Type: general      Patient location during evaluation: PACU  Patient participation: Yes- Able to Participate  Level of consciousness: awake and alert  Post-procedure vital signs: reviewed and stable  Pain management: adequate  Airway patency: patent  ADDIE mitigation strategies: Extubation while patient is awake  PONV status at discharge: No PONV  Anesthetic complications: no      Cardiovascular status: stable  Respiratory status: spontaneous ventilation and face mask  Hydration status: euvolemic  Follow-up not needed.          Vitals Value Taken Time   /54 07/25/22 1401   Temp 37.3 07/26/22 1020   Pulse 71 07/25/22 1414   Resp 18 07/25/22 1335   SpO2 98 % 07/25/22 1414   Vitals shown include unvalidated device data.      No case tracking events are documented in the log.      Pain/Kiko Score: Pain Rating Prior to Med Admin: 0 (7/25/2022  9:35 AM)  Kiko Score: 10 (7/25/2022  2:01 PM)

## 2022-07-29 ENCOUNTER — DOCUMENT SCAN (OUTPATIENT)
Dept: HOME HEALTH SERVICES | Facility: HOSPITAL | Age: 77
End: 2022-07-29
Payer: MEDICARE

## 2022-07-29 NOTE — ADDENDUM NOTE
Addendum  created 07/29/22 1028 by Jewel Villalobos MD    Clinical Note Signed, Intraprocedure Event edited

## 2022-07-29 NOTE — CARE UPDATE
APS Update    Patient called regarding her PNC catheter as today is the last day she should keep it in. She states her pain has been well controlled and she has had no issues with the NIMBUS pump. She removed the catheter and visualized the blue tip.     Jewel Villalobos MD  Anesthesiology PGY-4

## 2022-08-02 ENCOUNTER — TELEPHONE (OUTPATIENT)
Dept: ORTHOPEDICS | Facility: CLINIC | Age: 77
End: 2022-08-02
Payer: MEDICARE

## 2022-08-02 NOTE — TELEPHONE ENCOUNTER
----- Message from Tabitha Adam sent at 8/2/2022  9:43 AM CDT -----       Type: Patient Returning Call    Who Called: Patient   Who Left Message for Patient: NA    Does the patient know what this is regarding?: Patient says that school starts around the time of her appointment on 08/10/2022. She would like a call back to see if she could be scheduled at a later date, possibly Friday 08/12/2022 (preferably mid morning appointments).     Would the patient rather a call back or a response via [facility]? Call  Best Call Back Number: [phone]  Additional Information: Please assist, thank you!

## 2022-08-12 ENCOUNTER — OFFICE VISIT (OUTPATIENT)
Dept: ORTHOPEDICS | Facility: CLINIC | Age: 77
End: 2022-08-12
Payer: MEDICARE

## 2022-08-12 VITALS — WEIGHT: 160.06 LBS | HEIGHT: 60 IN | BODY MASS INDEX: 31.42 KG/M2

## 2022-08-12 DIAGNOSIS — S82.032M: Primary | ICD-10-CM

## 2022-08-12 PROCEDURE — 99213 OFFICE O/P EST LOW 20 MIN: CPT | Mod: PBBFAC | Performed by: PHYSICIAN ASSISTANT

## 2022-08-12 PROCEDURE — 99999 PR PBB SHADOW E&M-EST. PATIENT-LVL III: CPT | Mod: PBBFAC,,, | Performed by: PHYSICIAN ASSISTANT

## 2022-08-12 PROCEDURE — 99024 POSTOP FOLLOW-UP VISIT: CPT | Mod: POP,,, | Performed by: PHYSICIAN ASSISTANT

## 2022-08-12 PROCEDURE — 99024 PR POST-OP FOLLOW-UP VISIT: ICD-10-PCS | Mod: POP,,, | Performed by: PHYSICIAN ASSISTANT

## 2022-08-12 PROCEDURE — 99999 PR PBB SHADOW E&M-EST. PATIENT-LVL III: ICD-10-PCS | Mod: PBBFAC,,, | Performed by: PHYSICIAN ASSISTANT

## 2022-08-12 NOTE — PROGRESS NOTES
Patient ID: Brunilda Bass is a 76 y.o. female.     Chief Complaint: Post-op Evaluation of the Left Knee     Principal Orthopedic Problem: Grade 1 open Left patella fracture     Relevant Medical History: according to chart     PMHX: HTN, hypothyroidism, and HPLD   They deny IV drug use.   They deny tobacco use.   They deny alcohol use.   They deny immunosuppressant medications.  They deny chemotherapy.  They deny radiation.      HPI: Ms. Bass is a 76 year old female who presented to the ED on 06/07/22 with left knee pain after fall.   RADS: CT: Transverse plane fracture of the patella with 2.5 cm of distraction.  Distal fragment appears comminuted.  Several punctate foci of gas concerning for open injury.  06/13/22: :   Open reduction internal fixation of left patella fracture  Excisional and nonexcisional Debridement irrigation open left patella fracture, subcutaneous tissue and bone   ED on 07/16/22 after fall when leg gave out.   RADS:  failed fixation with displacement of the inferior patellar fracture fragment and patella Marian of the proximal fracture fragment   07/25/22: : revision PROF patella, left      Ms. Bass is here today for a post-operative visit    Interval History:  she reports that she is doing well.   she is at home . she is nto participating in PT/OT. She has keep knee brace in place and straight  Pain is controlled.  she is not taking pain medication.    she denies fever, chills, and sweats .     Physical exam:  Dressing taken down.  Incision is clean, dry and intact.  Staples removed without difficulty.   Healing well no signs of breakdown or infection.    RADS: All pertinent images were reviewed by myself:   none done today    Assessment:  Post-op visit (  weeks)    Plan:  Current care, treatment plan, precautions, activity level/ modifications, limitations, rehabilitation exercises and proposed future treatment were discussed with the patient. We discussed the need to  monitor for changes in symptoms and condition and report them to the physician.  Discussed importance of compliance with all appointments and follow up examinations.     WOUND CARE ORDERS  - The patient was advised to keep the incision clean and dry for the next 24 hours after which she may wash the area with antibacterial soap in the shower. Will not submerge until the incision is completely healed  -Patient was advised to monitor wound closely and multiple times daily for any problems. Call clinic immediately or report to ED for immediate medical attention for any complications including reopening of wound, drainage, purulence, redness, streaking, odor, pain out of proportion, fever, chills, etc.       ACTIVITY:   -PT/OT, hold, Patient is responsible to establish and continue care   -knee brace in extension no bending   - weight bearing as tolerated with knee brace      PAIN MEDICATION:   - Multimodal pain control  - Pain medication: refill was not needed  - Pain medication refill policy provided to patient for review, yes.    - Patient was informed a multi-modal approach is used to treat their pain. With the goal to get off of narcotic pain medication and discontinue as soon as possible.   - ice and elevation to reduce pain and swelling       FOLLOW UP:   - Patient will follow up in the clinic in 2 weeks.  - X-ray of her knee 2 view AP lat  is needed.      If there are any questions prior to scheduled follow up, the patient was instructed to contact the office

## 2022-08-26 ENCOUNTER — HOSPITAL ENCOUNTER (OUTPATIENT)
Dept: RADIOLOGY | Facility: HOSPITAL | Age: 77
Discharge: HOME | End: 2022-08-26
Attending: PHYSICIAN ASSISTANT
Payer: MEDICARE

## 2022-08-26 ENCOUNTER — OFFICE VISIT (OUTPATIENT)
Dept: ORTHOPEDICS | Facility: CLINIC | Age: 77
End: 2022-08-26
Payer: MEDICARE

## 2022-08-26 VITALS — BODY MASS INDEX: 31.42 KG/M2 | WEIGHT: 160.06 LBS | HEIGHT: 60 IN

## 2022-08-26 DIAGNOSIS — S82.032M: Primary | ICD-10-CM

## 2022-08-26 DIAGNOSIS — S82.032M: ICD-10-CM

## 2022-08-26 PROCEDURE — 99999 PR PBB SHADOW E&M-EST. PATIENT-LVL III: ICD-10-PCS | Mod: PBBFAC,,, | Performed by: PHYSICIAN ASSISTANT

## 2022-08-26 PROCEDURE — 99999 PR PBB SHADOW E&M-EST. PATIENT-LVL III: CPT | Mod: PBBFAC,,, | Performed by: PHYSICIAN ASSISTANT

## 2022-08-26 PROCEDURE — 99024 POSTOP FOLLOW-UP VISIT: CPT | Mod: POP,,, | Performed by: PHYSICIAN ASSISTANT

## 2022-08-26 PROCEDURE — 73560 X-RAY EXAM OF KNEE 1 OR 2: CPT | Mod: 26,LT,, | Performed by: INTERNAL MEDICINE

## 2022-08-26 PROCEDURE — 99024 PR POST-OP FOLLOW-UP VISIT: ICD-10-PCS | Mod: POP,,, | Performed by: PHYSICIAN ASSISTANT

## 2022-08-26 PROCEDURE — 99213 OFFICE O/P EST LOW 20 MIN: CPT | Mod: PBBFAC | Performed by: PHYSICIAN ASSISTANT

## 2022-08-26 PROCEDURE — 73560 XR KNEE 1 OR 2 VIEW LEFT: ICD-10-PCS | Mod: 26,LT,, | Performed by: INTERNAL MEDICINE

## 2022-08-26 PROCEDURE — 73560 X-RAY EXAM OF KNEE 1 OR 2: CPT | Mod: TC,LT

## 2022-08-26 NOTE — PROGRESS NOTES
Patient ID: Brunilda Bass is a 76 y.o. female.     Chief Complaint: Post-op Evaluation of the Left Knee     Principal Orthopedic Problem: Grade 1 open Left patella fracture     Relevant Medical History: according to chart     PMHX: HTN, hypothyroidism, and HPLD   They deny IV drug use.   They deny tobacco use.   They deny alcohol use.   They deny immunosuppressant medications.  They deny chemotherapy.  They deny radiation.      HPI: Ms. Bass is a 76 year old female who presented to the ED on 06/07/22 with left knee pain after fall.   RADS: CT: Transverse plane fracture of the patella with 2.5 cm of distraction.  Distal fragment appears comminuted.  Several punctate foci of gas concerning for open injury.  06/13/22: :   Open reduction internal fixation of left patella fracture  Excisional and nonexcisional Debridement irrigation open left patella fracture, subcutaneous tissue and bone   ED on 07/16/22 after fall when leg gave out.   RADS:  failed fixation with displacement of the inferior patellar fracture fragment and patella Marian of the proximal fracture fragment   07/25/22: : revision PROF patella, left      Ms. Bass is here today for a post-operative visit    Interval History:  she reports that she is doing well.   she is at home . she is not participating in PT/OT. She has keep knee brace in place and straight  Pain is controlled.  she is not taking pain medication.    she denies fever, chills, and sweats .     Physical exam:  Dressing taken down.  Incision is clean, dry and intact.  .   Healing well no signs of breakdown or infection. Able to extend knee. No lag noted.     RADS: All pertinent images were reviewed by myself:   There are extensive surgical changes, fixating previously demonstrated transverse patellar fracture.  Fracture lines remain evident.  Adjacent osseous structures are intact.    Assessment:  Post-op visit (3  weeks)    Plan:  Current care, treatment plan, precautions,  activity level/ modifications, limitations, rehabilitation exercises and proposed future treatment were discussed with the patient. We discussed the need to monitor for changes in symptoms and condition and report them to the physician.  Discussed importance of compliance with all appointments and follow up examinations.     WOUND CARE ORDERS  - healing well.   -Patient was advised to monitor wound closely and multiple times daily for any problems. Call clinic immediately or report to ED for immediate medical attention for any complications including reopening of wound, drainage, purulence, redness, streaking, odor, pain out of proportion, fever, chills, etc.       ACTIVITY:   -PT/OT, home health , Patient is responsible to establish and continue care   -knee brace slowly advance 30 degree x 2 weeks then 60 degreex 2 weeks then 90    - weight bearing as tolerated with knee brace      PAIN MEDICATION:   - Multimodal pain control  - Pain medication: refill was not needed  - Pain medication refill policy provided to patient for review, yes.    - Patient was informed a multi-modal approach is used to treat their pain. With the goal to get off of narcotic pain medication and discontinue as soon as possible.   - ice and elevation to reduce pain and swelling       FOLLOW UP:   - Patient will follow up in the clinic in 4 weeks. With .   - X-ray of her knee 2 view AP lat  is needed.      If there are any questions prior to scheduled follow up, the patient was instructed to contact the office

## 2022-08-30 ENCOUNTER — PATIENT MESSAGE (OUTPATIENT)
Dept: ORTHOPEDICS | Facility: CLINIC | Age: 77
End: 2022-08-30
Payer: MEDICARE

## 2022-09-06 ENCOUNTER — PATIENT MESSAGE (OUTPATIENT)
Dept: ORTHOPEDICS | Facility: CLINIC | Age: 77
End: 2022-09-06
Payer: MEDICARE

## 2022-09-07 ENCOUNTER — PATIENT MESSAGE (OUTPATIENT)
Dept: ORTHOPEDICS | Facility: CLINIC | Age: 77
End: 2022-09-07
Payer: MEDICARE

## 2022-09-07 DIAGNOSIS — S82.032M: Primary | ICD-10-CM

## 2022-09-12 PROCEDURE — G0180 MD CERTIFICATION HHA PATIENT: HCPCS | Mod: ,,, | Performed by: PHYSICIAN ASSISTANT

## 2022-09-12 PROCEDURE — G0180 PR HOME HEALTH MD CERTIFICATION: ICD-10-PCS | Mod: ,,, | Performed by: PHYSICIAN ASSISTANT

## 2022-09-28 ENCOUNTER — HOSPITAL ENCOUNTER (OUTPATIENT)
Dept: RADIOLOGY | Facility: HOSPITAL | Age: 77
Discharge: HOME | End: 2022-09-28
Attending: PHYSICIAN ASSISTANT
Payer: MEDICARE

## 2022-09-28 ENCOUNTER — OFFICE VISIT (OUTPATIENT)
Dept: ORTHOPEDICS | Facility: CLINIC | Age: 77
End: 2022-09-28
Payer: MEDICARE

## 2022-09-28 ENCOUNTER — PATIENT MESSAGE (OUTPATIENT)
Dept: ORTHOPEDICS | Facility: CLINIC | Age: 77
End: 2022-09-28

## 2022-09-28 VITALS — HEIGHT: 60 IN | BODY MASS INDEX: 31.42 KG/M2 | WEIGHT: 160.06 LBS

## 2022-09-28 DIAGNOSIS — S82.032M: ICD-10-CM

## 2022-09-28 DIAGNOSIS — S82.032M: Primary | ICD-10-CM

## 2022-09-28 PROCEDURE — 99024 PR POST-OP FOLLOW-UP VISIT: ICD-10-PCS | Mod: POP,,, | Performed by: PHYSICIAN ASSISTANT

## 2022-09-28 PROCEDURE — 73560 XR KNEE 1 OR 2 VIEW LEFT: ICD-10-PCS | Mod: 26,LT,, | Performed by: RADIOLOGY

## 2022-09-28 PROCEDURE — 73560 X-RAY EXAM OF KNEE 1 OR 2: CPT | Mod: TC,LT

## 2022-09-28 PROCEDURE — 99024 POSTOP FOLLOW-UP VISIT: CPT | Mod: POP,,, | Performed by: PHYSICIAN ASSISTANT

## 2022-09-28 PROCEDURE — 99213 OFFICE O/P EST LOW 20 MIN: CPT | Mod: PBBFAC | Performed by: PHYSICIAN ASSISTANT

## 2022-09-28 PROCEDURE — 73560 X-RAY EXAM OF KNEE 1 OR 2: CPT | Mod: 26,LT,, | Performed by: RADIOLOGY

## 2022-09-28 PROCEDURE — 99999 PR PBB SHADOW E&M-EST. PATIENT-LVL III: ICD-10-PCS | Mod: PBBFAC,,, | Performed by: PHYSICIAN ASSISTANT

## 2022-09-28 PROCEDURE — 99999 PR PBB SHADOW E&M-EST. PATIENT-LVL III: CPT | Mod: PBBFAC,,, | Performed by: PHYSICIAN ASSISTANT

## 2022-09-29 DIAGNOSIS — S82.032M: Primary | ICD-10-CM

## 2022-09-30 NOTE — PROGRESS NOTES
Patient ID: Brunilda Bass is a 76 y.o. female.     Chief Complaint: Post-op Evaluation of the Left Knee     Principal Orthopedic Problem: Grade 1 open Left patella fracture     Relevant Medical History: according to chart     PMHX: HTN, hypothyroidism, and HPLD   They deny IV drug use.   They deny tobacco use.   They deny alcohol use.   They deny immunosuppressant medications.  They deny chemotherapy.  They deny radiation.      HPI: Ms. Bass is a 76 year old female who presented to the ED on 06/07/22 with left knee pain after fall.   RADS: CT: Transverse plane fracture of the patella with 2.5 cm of distraction.  Distal fragment appears comminuted.  Several punctate foci of gas concerning for open injury.  06/13/22: :   Open reduction internal fixation of left patella fracture  Excisional and nonexcisional Debridement irrigation open left patella fracture, subcutaneous tissue and bone   ED on 07/16/22 after fall when leg gave out.   RADS:  failed fixation with displacement of the inferior patellar fracture fragment and patella Mairan of the proximal fracture fragment   07/25/22: : revision PROF patella, left      Ms. Bass is here today for a post-operative visit    Interval History:  she reports that she is doing well.   she is at home . she is participating in PT/OT.  Home health once a week   Pain is controlled.  she is not taking pain medication.    she denies fever, chills, and sweats .     Physical exam:  arrived to clinic in wheelchair accompanied by son, knee brace in place at 60 degrees. .  Incision is clean, dry and intact.  .   Healing well no signs of breakdown or infection. Able to extend knee. No lag noted.     RADS: All pertinent images were reviewed by myself:   Postoperative changes are again identified relating to prior internal fixation of a fracture of the left patella, plate/screw instrumentation in place as before.  Appearance of this fracture appears stable when compared to  08/26/2022.  Remaining osseous structures demonstrate no additional areas suspicious for recent fracture or other significant abnormality.  No detrimental change.    Assessment:  Post-op visit (6 weeks)    Plan:  Current care, treatment plan, precautions, activity level/ modifications, limitations, rehabilitation exercises and proposed future treatment were discussed with the patient. We discussed the need to monitor for changes in symptoms and condition and report them to the physician.  Discussed importance of compliance with all appointments and follow up examinations.     WOUND CARE ORDERS  - healing well.   -Patient was advised to monitor wound closely and multiple times daily for any problems. Call clinic immediately or report to ED for immediate medical attention for any complications including reopening of wound, drainage, purulence, redness, streaking, odor, pain out of proportion, fever, chills, etc.       ACTIVITY:   -PT/OT, home health , Patient is responsible to establish and continue care   -knee brace  weeks then 90 once comfortable may gerardo brace   - weight bearing as tolerated with knee brace      PAIN MEDICATION:   - Multimodal pain control  - Pain medication: refill was not needed  - Pain medication refill policy provided to patient for review, yes.    - Patient was informed a multi-modal approach is used to treat their pain. With the goal to get off of narcotic pain medication and discontinue as soon as possible.   - ice and elevation to reduce pain and swelling       FOLLOW UP:   - Patient will follow up in the clinic in 6 weeks.   - X-ray of her knee 2 view AP lat  is needed.      If there are any questions prior to scheduled follow up, the patient was instructed to contact the office

## 2022-10-11 ENCOUNTER — EXTERNAL HOME HEALTH (OUTPATIENT)
Dept: HOME HEALTH SERVICES | Facility: HOSPITAL | Age: 77
End: 2022-10-11
Payer: MEDICARE

## 2022-10-27 ENCOUNTER — DOCUMENT SCAN (OUTPATIENT)
Dept: HOME HEALTH SERVICES | Facility: HOSPITAL | Age: 77
End: 2022-10-27
Payer: MEDICARE

## 2022-11-10 ENCOUNTER — OFFICE VISIT (OUTPATIENT)
Dept: ORTHOPEDICS | Facility: CLINIC | Age: 77
End: 2022-11-10
Payer: MEDICARE

## 2022-11-10 ENCOUNTER — TELEPHONE (OUTPATIENT)
Dept: ORTHOPEDICS | Facility: CLINIC | Age: 77
End: 2022-11-10

## 2022-11-10 ENCOUNTER — HOSPITAL ENCOUNTER (OUTPATIENT)
Dept: RADIOLOGY | Facility: HOSPITAL | Age: 77
Discharge: HOME | End: 2022-11-10
Attending: PHYSICIAN ASSISTANT
Payer: MEDICARE

## 2022-11-10 VITALS — HEIGHT: 60 IN | WEIGHT: 160.06 LBS | BODY MASS INDEX: 31.42 KG/M2

## 2022-11-10 DIAGNOSIS — S82.032M: ICD-10-CM

## 2022-11-10 DIAGNOSIS — S82.032M: Primary | ICD-10-CM

## 2022-11-10 PROCEDURE — 99999 PR PBB SHADOW E&M-EST. PATIENT-LVL III: ICD-10-PCS | Mod: PBBFAC,,, | Performed by: PHYSICIAN ASSISTANT

## 2022-11-10 PROCEDURE — 99213 OFFICE O/P EST LOW 20 MIN: CPT | Mod: PBBFAC | Performed by: PHYSICIAN ASSISTANT

## 2022-11-10 PROCEDURE — 99024 PR POST-OP FOLLOW-UP VISIT: ICD-10-PCS | Mod: POP,,, | Performed by: PHYSICIAN ASSISTANT

## 2022-11-10 PROCEDURE — 73560 XR KNEE 1 OR 2 VIEW LEFT: ICD-10-PCS | Mod: 26,LT,, | Performed by: RADIOLOGY

## 2022-11-10 PROCEDURE — 99024 POSTOP FOLLOW-UP VISIT: CPT | Mod: POP,,, | Performed by: PHYSICIAN ASSISTANT

## 2022-11-10 PROCEDURE — 73560 X-RAY EXAM OF KNEE 1 OR 2: CPT | Mod: TC,LT

## 2022-11-10 PROCEDURE — 73560 X-RAY EXAM OF KNEE 1 OR 2: CPT | Mod: 26,LT,, | Performed by: RADIOLOGY

## 2022-11-10 PROCEDURE — 99999 PR PBB SHADOW E&M-EST. PATIENT-LVL III: CPT | Mod: PBBFAC,,, | Performed by: PHYSICIAN ASSISTANT

## 2022-11-10 NOTE — TELEPHONE ENCOUNTER
Therapy orders fax to [facility] -[phone] and [facility] -[phone]. Done.

## 2022-11-10 NOTE — PROGRESS NOTES
Patient ID: Brunilda Bass is a 76 y.o. female.     Chief Complaint: Post-op Evaluation of the Left Knee     Principal Orthopedic Problem: Grade 1 open Left patella fracture     Relevant Medical History: according to chart     PMHX: HTN, hypothyroidism, and HPLD   They deny IV drug use.   They deny tobacco use.   They deny alcohol use.   They deny immunosuppressant medications.  They deny chemotherapy.  They deny radiation.      HPI: Ms. Bass is a 76 year old female who presented to the ED on 06/07/22 with left knee pain after fall.   RADS: CT: Transverse plane fracture of the patella with 2.5 cm of distraction.  Distal fragment appears comminuted.  Several punctate foci of gas concerning for open injury.  06/13/22: :   Open reduction internal fixation of left patella fracture  Excisional and nonexcisional Debridement irrigation open left patella fracture, subcutaneous tissue and bone   ED on 07/16/22 after fall when leg gave out.   RADS:  failed fixation with displacement of the inferior patellar fracture fragment and patella Marian of the proximal fracture fragment   07/25/22: : revision ORIF patella, left    Ms. Bass is here today for a post-operative visit    Interval History:  she reports that she is doing well.   she is at home . she is participating in PT/OT.  D/c form home health    Pain is controlled.  she is not taking pain medication.    she denies fever, chills, and sweats .     Physical exam:  arrived to clinic with walker accompanied by family member, .  Incision is clean, dry and intact.  .   Healing well no signs of breakdown or infection. Range of motion 0-90 mild swelling,      RADS: All pertinent images were reviewed by myself:   Postoperative changes are again identified relating to prior internal fixation of the widely distracted left patellar fracture documented on 06/07/2022, hardware in place transfixing the major fracture fragments as before.  No evidence of more recent  fracture, lytic destructive process, hardware failure, or other significant detrimental interval change since 09/28/2022 is appreciated.    Assessment:  Post-op visit (10 weeks)    Plan:  Current care, treatment plan, precautions, activity level/ modifications, limitations, rehabilitation exercises and proposed future treatment were discussed with the patient. We discussed the need to monitor for changes in symptoms and condition and report them to the physician.  Discussed importance of compliance with all appointments and follow up examinations.     WOUND CARE ORDERS  - healing well.   -Patient was advised to monitor wound closely and multiple times daily for any problems. Call clinic immediately or report to ED for immediate medical attention for any complications including reopening of wound, drainage, purulence, redness, streaking, odor, pain out of proportion, fever, chills, etc.       ACTIVITY:   -PT/OT, will call with place of service  , Patient is responsible to establish and continue care   -ROMAT    - weight bearing as tolerated with knee brace      PAIN MEDICATION:   - Multimodal pain control  - Pain medication: refill was not needed  - Pain medication refill policy provided to patient for review, yes.    - Patient was informed a multi-modal approach is used to treat their pain. With the goal to get off of narcotic pain medication and discontinue as soon as possible.   - ice and elevation to reduce pain and swelling       FOLLOW UP:   - Patient will follow up in the clinic in 12 weeks.   - X-ray of her knee 2 view AP lat  is needed.      If there are any questions prior to scheduled follow up, the patient was instructed to contact the office

## 2023-02-10 ENCOUNTER — HOSPITAL ENCOUNTER (OUTPATIENT)
Dept: RADIOLOGY | Facility: HOSPITAL | Age: 78
Discharge: HOME | End: 2023-02-10
Attending: PHYSICIAN ASSISTANT
Payer: MEDICARE

## 2023-02-10 ENCOUNTER — OFFICE VISIT (OUTPATIENT)
Dept: ORTHOPEDICS | Facility: CLINIC | Age: 78
End: 2023-02-10
Payer: MEDICARE

## 2023-02-10 VITALS — BODY MASS INDEX: 31.42 KG/M2 | HEIGHT: 60 IN | WEIGHT: 160.06 LBS

## 2023-02-10 DIAGNOSIS — M89.9 DISORDER OF BONE, UNSPECIFIED: ICD-10-CM

## 2023-02-10 DIAGNOSIS — S82.032M: ICD-10-CM

## 2023-02-10 DIAGNOSIS — S82.032M: Primary | ICD-10-CM

## 2023-02-10 PROCEDURE — 73560 X-RAY EXAM OF KNEE 1 OR 2: CPT | Mod: 26,LT,, | Performed by: RADIOLOGY

## 2023-02-10 PROCEDURE — 99999 PR PBB SHADOW E&M-EST. PATIENT-LVL III: CPT | Mod: PBBFAC,,, | Performed by: PHYSICIAN ASSISTANT

## 2023-02-10 PROCEDURE — 99999 PR PBB SHADOW E&M-EST. PATIENT-LVL III: ICD-10-PCS | Mod: PBBFAC,,, | Performed by: PHYSICIAN ASSISTANT

## 2023-02-10 PROCEDURE — 99213 OFFICE O/P EST LOW 20 MIN: CPT | Mod: PBBFAC | Performed by: PHYSICIAN ASSISTANT

## 2023-02-10 PROCEDURE — 73560 X-RAY EXAM OF KNEE 1 OR 2: CPT | Mod: TC,LT

## 2023-02-10 PROCEDURE — 73560 XR KNEE 1 OR 2 VIEW LEFT: ICD-10-PCS | Mod: 26,LT,, | Performed by: RADIOLOGY

## 2023-02-10 PROCEDURE — 99213 OFFICE O/P EST LOW 20 MIN: CPT | Mod: S$PBB,,, | Performed by: PHYSICIAN ASSISTANT

## 2023-02-10 PROCEDURE — 99213 PR OFFICE/OUTPT VISIT, EST, LEVL III, 20-29 MIN: ICD-10-PCS | Mod: S$PBB,,, | Performed by: PHYSICIAN ASSISTANT

## 2023-02-10 NOTE — PROGRESS NOTES
Patient ID: Brunilda Bass is a 76 y.o. female.     Chief Complaint: Post-op Evaluation of the Left Knee     Principal Orthopedic Problem: Grade 1 open Left patella fracture     Relevant Medical History: according to chart     PMHX: HTN, hypothyroidism, and HPLD   They deny IV drug use.   They deny tobacco use.   They deny alcohol use.   They deny immunosuppressant medications.  They deny chemotherapy.  They deny radiation.      HPI: Ms. Bass is a 76 year old female who presented to the ED on 06/07/22 with left knee pain after fall.   RADS: CT: Transverse plane fracture of the patella with 2.5 cm of distraction.  Distal fragment appears comminuted.  Several punctate foci of gas concerning for open injury.  06/13/22: :   Open reduction internal fixation of left patella fracture  Excisional and nonexcisional Debridement irrigation open left patella fracture, subcutaneous tissue and bone   ED on 07/16/22 after fall when leg gave out.   RADS:  failed fixation with displacement of the inferior patellar fracture fragment and patella Marian of the proximal fracture fragment   07/25/22: : revision ORIF patella, left    Ms. Bass is here today for a post-operative visit    Interval History:  she reports that she is doing well.   she is at home . she was  participating in PT/OT.    Pain is controlled.  she is not taking pain medication.    she denies fever, chills, and sweats .     Physical exam:  arrived to clinic unassisted  accompanied by family member, .  Incision is clean, dry and intact.  .   Healing well no signs of breakdown or infection. Range of motion 0-90 mild swelling,      RADS: All pertinent images were reviewed by myself:   Intact and unchanged ORIF hardware within the known patellar fracture.  No significant interval healing.  No adverse change in alignment.  Bones are osteopenic.  Mild medial tibiofemoral compartment osteoarthritis.  Small suprapatellar joint effusion.  Assessment:  Post-op  visit     Plan:  Current care, treatment plan, precautions, activity level/ modifications, limitations, rehabilitation exercises and proposed future treatment were discussed with the patient. We discussed the need to monitor for changes in symptoms and condition and report them to the physician.  Discussed importance of compliance with all appointments and follow up examinations.     WOUND CARE ORDERS  - healing well.   -Patient was advised to monitor wound closely and multiple times daily for any problems. Call clinic immediately or report to ED for immediate medical attention for any complications including reopening of wound, drainage, purulence, redness, streaking, odor, pain out of proportion, fever, chills, etc.       ACTIVITY:   -PT/OT, work on own , Patient is responsible to establish and continue care   -ROMAT    - weight bearing as tolerated with knee brace      PAIN MEDICATION:   - Multimodal pain control  - Pain medication: refill was not needed  - Pain medication refill policy provided to patient for review, yes.    - Patient was informed a multi-modal approach is used to treat their pain. With the goal to get off of narcotic pain medication and discontinue as soon as possible.   - ice and elevation to reduce pain and swelling       FOLLOW UP:   - Patient will follow up in the clinic in 12 weeks.   - X-ray of her knee 2 view AP lat  is needed.      If there are any questions prior to scheduled follow up, the patient was instructed to contact the office

## 2023-07-31 ENCOUNTER — OFFICE VISIT (OUTPATIENT)
Dept: ORTHOPEDICS | Facility: CLINIC | Age: 78
End: 2023-07-31
Payer: MEDICARE

## 2023-07-31 ENCOUNTER — HOSPITAL ENCOUNTER (OUTPATIENT)
Dept: RADIOLOGY | Facility: HOSPITAL | Age: 78
Discharge: HOME | End: 2023-07-31
Attending: PHYSICIAN ASSISTANT
Payer: MEDICARE

## 2023-07-31 VITALS — BODY MASS INDEX: 31.42 KG/M2 | WEIGHT: 160.06 LBS | HEIGHT: 60 IN

## 2023-07-31 DIAGNOSIS — S82.032M: ICD-10-CM

## 2023-07-31 DIAGNOSIS — S82.032M: Primary | ICD-10-CM

## 2023-07-31 PROCEDURE — 99999 PR PBB SHADOW E&M-EST. PATIENT-LVL III: ICD-10-PCS | Mod: PBBFAC,,, | Performed by: PHYSICIAN ASSISTANT

## 2023-07-31 PROCEDURE — 99213 OFFICE O/P EST LOW 20 MIN: CPT | Mod: S$PBB,,, | Performed by: PHYSICIAN ASSISTANT

## 2023-07-31 PROCEDURE — 99999 PR PBB SHADOW E&M-EST. PATIENT-LVL III: CPT | Mod: PBBFAC,,, | Performed by: PHYSICIAN ASSISTANT

## 2023-07-31 PROCEDURE — 99213 OFFICE O/P EST LOW 20 MIN: CPT | Mod: PBBFAC | Performed by: PHYSICIAN ASSISTANT

## 2023-07-31 PROCEDURE — 99213 PR OFFICE/OUTPT VISIT, EST, LEVL III, 20-29 MIN: ICD-10-PCS | Mod: S$PBB,,, | Performed by: PHYSICIAN ASSISTANT

## 2023-07-31 PROCEDURE — 73560 XR KNEE 1 OR 2 VIEW LEFT: ICD-10-PCS | Mod: 26,LT,, | Performed by: RADIOLOGY

## 2023-07-31 PROCEDURE — 73560 X-RAY EXAM OF KNEE 1 OR 2: CPT | Mod: TC,LT

## 2023-07-31 PROCEDURE — 73560 X-RAY EXAM OF KNEE 1 OR 2: CPT | Mod: 26,LT,, | Performed by: RADIOLOGY

## 2023-07-31 NOTE — PROGRESS NOTES
Patient ID: Brunilda Bass is a 76 y.o. female.     Chief Complaint: Post-op Evaluation of the Left Knee     Principal Orthopedic Problem: Grade 1 open Left patella fracture     Relevant Medical History: according to chart     PMHX: HTN, hypothyroidism, and HPLD   They deny IV drug use.   They deny tobacco use.   They deny alcohol use.   They deny immunosuppressant medications.  They deny chemotherapy.  They deny radiation.      HPI: Ms. Bass is a 76 year old female who presented to the ED on 06/07/22 with left knee pain after fall.   RADS: CT: Transverse plane fracture of the patella with 2.5 cm of distraction.  Distal fragment appears comminuted.  Several punctate foci of gas concerning for open injury.  06/13/22: :   Open reduction internal fixation of left patella fracture  Excisional and nonexcisional Debridement irrigation open left patella fracture, subcutaneous tissue and bone   ED on 07/16/22 after fall when leg gave out.   RADS:  failed fixation with displacement of the inferior patellar fracture fragment and patella Marian of the proximal fracture fragment   07/25/22: : revision ORIF patella, left    Ms. Bass is here today for a post-operative visit    Interval History:  she reports that she is doing well, but stated that on her trip to [place] she did not do some things. She also reports her anterior knee feels funny.   she is at home . she was participating in PT/OT.    Pain is controlled.  she is not taking pain medication.    she denies fever, chills, and sweats .     Physical exam:  arrived to clinic unassisted, brought cane  unaccompanied .  Incision is clean, dry and intact.  .   Healing well no signs of breakdown or infection. Range of motion 0-90 100, mild TTP medial and alteral joit line     RADS: All pertinent images were reviewed by myself:   Postoperative changes are again identified relating to prior internal fixation of a fracture of the left patella, plate/screw  instrumentation in place as before.  Appearance of the patella is unchanged since the prior examination referenced above.  Remaining osseous structures also appear stable, with no evidence of more recent fracture, lytic destructive process, or other significant abnormality noted.  No hardware failure.  No significant joint effusion.    Assessment:  Post-op visit     Plan:  Current care, treatment plan, precautions, activity level/ modifications, limitations, rehabilitation exercises and proposed future treatment were discussed with the patient. We discussed the need to monitor for changes in symptoms and condition and report them to the physician.  Discussed importance of compliance with all appointments and follow up examinations.     WOUND CARE ORDERS  - healing well.   -Patient was advised to monitor wound closely and multiple times daily for any problems. Call clinic immediately or report to ED for immediate medical attention for any complications including reopening of wound, drainage, purulence, redness, streaking, odor, pain out of proportion, fever, chills, etc.       ACTIVITY:   -PT/OT, [facility] , Patient is responsible to establish and continue care   -ROMAT    - weight bearing as tolerated      PAIN MEDICATION:   - OTC as needed  \   FOLLOW UP:   - Patient will follow up in the clinic if any issues or concerns .   - X-ray of her knee 2 view AP lat  is needed.      If there are any questions prior to scheduled follow up, the patient was instructed to contact the office

## 2023-08-16 ENCOUNTER — TELEPHONE (OUTPATIENT)
Dept: ORTHOPEDICS | Facility: CLINIC | Age: 78
End: 2023-08-16
Payer: MEDICARE

## 2023-08-16 NOTE — TELEPHONE ENCOUNTER
Therapy orders fax to [facility] -[phone] and [facility] -[phone]. Done.

## 2023-08-17 ENCOUNTER — TELEPHONE (OUTPATIENT)
Dept: ORTHOPEDICS | Facility: CLINIC | Age: 78
End: 2023-08-17
Payer: MEDICARE

## 2023-08-17 NOTE — TELEPHONE ENCOUNTER
----- Message from Elham Talavera sent at 8/17/2023  9:44 AM CDT -----  Regarding: FT order  Contact: pt [phone]  PATIENTCALL     Pt is calling to speak with someone in provider office regarding her  order to sent to [facility]  fax  [phone].she is asking for a return call please call pt at  [phone]

## 2024-10-08 ENCOUNTER — OFFICE VISIT (OUTPATIENT)
Dept: OPHTHALMOLOGY | Facility: CLINIC | Age: 79
End: 2024-10-08
Payer: MEDICARE

## 2024-10-08 DIAGNOSIS — H35.3131 EARLY DRY STAGE NONEXUDATIVE AGE-RELATED MACULAR DEGENERATION OF BOTH EYES: ICD-10-CM

## 2024-10-08 DIAGNOSIS — H25.11 NUCLEAR SCLEROTIC CATARACT OF RIGHT EYE: Primary | ICD-10-CM

## 2024-10-08 DIAGNOSIS — H25.12 NUCLEAR SCLEROTIC CATARACT OF LEFT EYE: ICD-10-CM

## 2024-10-08 PROCEDURE — 99212 OFFICE O/P EST SF 10 MIN: CPT | Mod: PBBFAC | Performed by: OPHTHALMOLOGY

## 2024-10-08 PROCEDURE — 99204 OFFICE O/P NEW MOD 45 MIN: CPT | Mod: S$PBB,,, | Performed by: OPHTHALMOLOGY

## 2024-10-08 PROCEDURE — 92134 CPTRZ OPH DX IMG PST SGM RTA: CPT | Mod: PBBFAC | Performed by: OPHTHALMOLOGY

## 2024-10-08 PROCEDURE — 99999 PR PBB SHADOW E&M-EST. PATIENT-LVL II: CPT | Mod: PBBFAC,,, | Performed by: OPHTHALMOLOGY

## 2024-10-08 PROCEDURE — 92136 OPHTHALMIC BIOMETRY: CPT | Mod: PBBFAC,RT | Performed by: OPHTHALMOLOGY

## 2024-10-08 NOTE — PROGRESS NOTES
HPI    Dr. Kendy Gambino OD    Piece of plastic or wood penetrated cornea 60+ yrs ago.    Sx: None    Gtt's: None    Patient is here for cataract evaluation.  Pt. States vision have decreased in the past few years..  Pt. Have trouble with glare and see floaters in OU.  Pt. Denies diplopia, flashes, photophobia, pain or discomfort.  Last edited by Apryl Morrissey on 10/8/2024 11:28 AM.            Assessment /Plan     For exam results, see Encounter Report.    Nuclear sclerotic cataract of right eye  -     IOL Master - OD - Right Eye    Nuclear sclerotic cataract of left eye    Early dry stage nonexudative age-related macular degeneration of both eyes  -     Posterior Segment OCT Retina-Both eyes                 Visually significant nuclear sclerotic cataract    - Cataracts are interfering with activities of daily living, including night time driving.  - Pt desires cataract surgery for visual rehabilitation.   - Risks / benefits/ alternatives were discussed and patient agrees to proceed with surgery.   - IOL options discussed as well, according to patient's goals and concomitant ocular pathology.  - Target: plano.      DIBOO 17.5 OD    DIBOO  17.5 OS      Myopia - wants to wear readers  post sx.    Pt going to [place] end of nov.    Dry amd OU  - shayla blanco    Today's visit is associated with current and anticipated ongoing medical care related to this patient's single serious/complex condition (amd). Follow up is to be continued indefinitely to monitor the condition.

## 2024-12-11 DIAGNOSIS — H25.12 NUCLEAR SCLEROTIC CATARACT OF LEFT EYE: ICD-10-CM

## 2024-12-11 DIAGNOSIS — H25.11 NUCLEAR SCLEROTIC CATARACT OF RIGHT EYE: Primary | ICD-10-CM

## 2024-12-12 RX ORDER — PREDNISOLONE/MOXIFLOX/BROMFEN 1 %-0.5 %
1 SUSPENSION, DROPS(FINAL DOSAGE FORM)(ML) OPHTHALMIC (EYE) 3 TIMES DAILY
Qty: 8 ML | Refills: 3 | Status: SHIPPED | OUTPATIENT
Start: 2024-12-12

## 2024-12-18 ENCOUNTER — TELEPHONE (OUTPATIENT)
Dept: OPHTHALMOLOGY | Facility: CLINIC | Age: 79
End: 2024-12-18
Payer: MEDICARE

## 2024-12-30 ENCOUNTER — TELEPHONE (OUTPATIENT)
Dept: OPHTHALMOLOGY | Facility: CLINIC | Age: 79
End: 2024-12-30
Payer: MEDICARE

## 2024-12-30 NOTE — TELEPHONE ENCOUNTER
----- Message from [employer] sent at 12/30/2024 10:50 AM CST -----  Regarding: Rescheduling Request  Contact: [phone]  Rescheduling Request           Appt Type:  Ep      Date/Time Preference: 1/10 after 9:00 am or 10:00 am     Caller Name: pt      Contact Preference:   [phone]    Comment: Pt would like to r/s post op. Would also like to know arrival time. Please call to advise thank you

## 2024-12-30 NOTE — TELEPHONE ENCOUNTER
Offered 10:45 am on 1/10 @ [place]. Patient states she will call back after speaking to daughter.

## 2025-01-02 ENCOUNTER — TELEPHONE (OUTPATIENT)
Dept: OPHTHALMOLOGY | Facility: CLINIC | Age: 80
End: 2025-01-02
Payer: MEDICARE

## 2025-01-02 NOTE — TELEPHONE ENCOUNTER
Spoke with pt provided arrival time of 10:00 for sx on 1/8/25 with Dr. Mcnair @ [place]. Pt has eyedrops and packet.

## 2025-01-03 NOTE — H&P
"History    Chief complaint:  Painless progressive vision loss    Present Ilness/Diagnosis: Nuclear sclerotic Cataract    Past Medical History:  has a past medical history of Hyperlipidemia, Hypertension, and Thyroid disease.    Family History/Social History: refer to chart    Allergies:   Review of patient's allergies indicates:   Allergen Reactions    Aspirin      "chest burns"      Lisinopril Other (See Comments)     Coughing       Current Medications: No current facility-administered medications for this encounter.    Current Outpatient Medications:     aspirin (ECOTRIN) 81 MG EC tablet, Take 1 tablet (81 mg total) by mouth 2 (two) times a day., Disp: 60 tablet, Rfl: 0    atorvastatin (LIPITOR) 20 MG tablet, Take by mouth every evening., Disp: , Rfl:     cetirizine (ZYRTEC) 10 MG tablet, Take by mouth nightly as needed., Disp: , Rfl:     chlorthalidone (HYGROTEN) 25 MG Tab, Take 25 mg by mouth nightly., Disp: , Rfl:     docusate sodium (COLACE) 100 MG capsule, Take 1 capsule (100 mg total) by mouth 2 (two) times daily as needed for Constipation. Take this medication with prescription pain medicine, Disp: 60 capsule, Rfl: 0    fluticasone propionate (FLONASE) 50 mcg/actuation nasal spray, SHAKE LQ AND U 2 SPRAYS IEN QD UTD, Disp: , Rfl:     levothyroxine (SYNTHROID) 50 MCG tablet, Take by mouth before breakfast., Disp: , Rfl:     loratadine (CLARITIN) 10 mg tablet, Take 10 mg by mouth nightly as needed for Allergies., Disp: , Rfl:     metoprolol succinate (TOPROL-XL) 50 MG 24 hr tablet, Take by mouth every evening., Disp: , Rfl:     omeprazole (PRILOSEC) 20 MG capsule, Take by mouth every morning., Disp: , Rfl:     ondansetron (ZOFRAN-ODT) 4 MG TbDL, Take 1 tablet (4 mg total) by mouth every 8 (eight) hours as needed (nausea or vomiting)., Disp: 12 tablet, Rfl: 0    oxyCODONE (ROXICODONE) 5 MG immediate release tablet, Take 1 tablet (5 mg total) by mouth every 6 (six) hours as needed for Pain. May take 1-2 tablets " every 6 hours as needed for pain, Disp: 30 tablet, Rfl: 0    prednisoLONE-moxiflox-bromfen 1-0.5-0.075 % DrpS, Apply 1 drop to eye 3 (three) times daily., Disp: 8 mL, Rfl: 3    prednisoLONE-moxiflox-bromfen 1-0.5-0.075 % DrpS, Apply 1 drop to eye 3 (three) times daily., Disp: 8 mL, Rfl: 3    sertraline (ZOLOFT) 50 MG tablet, Take by mouth every evening., Disp: , Rfl:     Facility-Administered Medications Ordered in Other Encounters:     fentaNYL 50 mcg/mL injection  mcg,  mcg, Intravenous, PRN, Jose Bailey MD    midazolam (VERSED) 1 mg/mL injection 0.5-4 mg, 0.5-4 mg, Intravenous, PRN, Jose Bailey MD, 2 mg at 07/25/22 0905    ASA Score: II     Mallampati Score: II     Plan for Sedation: Moderate Sedation     Patient or Family History of Anesthesia problems : No    Physical Exam    BP: Vital signs stable  General: No apparent distress  HEENT: nuclear sclerotic cataract  Lungs: adequate respirations  Heart: + pulses  Abdomen: soft  Rectal/pelvic: deferred    Impression: Visually significant Cataract.    See previous clinic notes for surgical indications.    Plan: Phacoemulsification with implantation of Intraocular lens

## 2025-01-07 NOTE — PRE-PROCEDURE INSTRUCTIONS
The following was discussed with pt via phone and sent to pt portal. Pt verbalized understanding. Pt to be accompanied by her daughter.    - Nothing to eat or drink after midnight the night before your surgery, except AM meds with small sips of water    - HOLD all Diabetic meds AM of surgery  - HOLD all Insulin AM of surgery  - HOLD all Fluid pills AM of surgery  - HOLD all non-insulin shots until after surgery (Ozempic, Mounjaro, Trulicity, Victoza, Byetta, Wegovy and Adlyxin) (up to 7 days prior)  - HOLD all vits and herbal meds AM of surgery    - TAKE blood thinner meds AM of surgery (unless otherwise directed)  - TAKE all B/P meds, EXCEPT those that contain a fluid pill  - USE inhalers as needed and bring AM of surgery  - USE eye drops as directed    - Shower and wash face with dial soap for 3 mins PM prior and AM of surgery  - No powder, lotions, creams, (makeup),  or jewelry    - Wear comfortable clothing (button up shirt)     (Patients ride may not leave while patient is in surgery)     -- 2nd floor surgery ctr at [facility] @ [address] [facility], [place] [address]

## 2025-01-08 ENCOUNTER — HOSPITAL ENCOUNTER (OUTPATIENT)
Facility: HOSPITAL | Age: 80
Discharge: HOME | End: 2025-01-08
Attending: OPHTHALMOLOGY | Admitting: OPHTHALMOLOGY
Payer: MEDICARE

## 2025-01-08 VITALS
RESPIRATION RATE: 20 BRPM | HEART RATE: 65 BPM | OXYGEN SATURATION: 99 % | WEIGHT: 167 LBS | HEIGHT: 60 IN | BODY MASS INDEX: 32.79 KG/M2 | DIASTOLIC BLOOD PRESSURE: 70 MMHG | SYSTOLIC BLOOD PRESSURE: 155 MMHG | TEMPERATURE: 98 F

## 2025-01-08 DIAGNOSIS — H25.11 NUCLEAR SCLEROTIC CATARACT OF RIGHT EYE: Primary | ICD-10-CM

## 2025-01-08 DIAGNOSIS — H25.10 AGE-RELATED NUCLEAR CATARACT: ICD-10-CM

## 2025-01-08 PROCEDURE — 36000707: Performed by: OPHTHALMOLOGY

## 2025-01-08 PROCEDURE — 71000015 HC POSTOP RECOV 1ST HR: Performed by: OPHTHALMOLOGY

## 2025-01-08 PROCEDURE — 27201423 OPTIME MED/SURG SUP & DEVICES STERILE SUPPLY: Performed by: OPHTHALMOLOGY

## 2025-01-08 PROCEDURE — V2632 POST CHMBR INTRAOCULAR LENS: HCPCS | Performed by: OPHTHALMOLOGY

## 2025-01-08 PROCEDURE — 36000706: Performed by: OPHTHALMOLOGY

## 2025-01-08 PROCEDURE — 94761 N-INVAS EAR/PLS OXIMETRY MLT: CPT

## 2025-01-08 PROCEDURE — 66984 XCAPSL CTRC RMVL W/O ECP: CPT | Mod: RT,,, | Performed by: OPHTHALMOLOGY

## 2025-01-08 PROCEDURE — 25000003 PHARM REV CODE 250: Performed by: OPHTHALMOLOGY

## 2025-01-08 PROCEDURE — 99152 MOD SED SAME PHYS/QHP 5/>YRS: CPT | Mod: ,,, | Performed by: OPHTHALMOLOGY

## 2025-01-08 PROCEDURE — 99900035 HC TECH TIME PER 15 MIN (STAT)

## 2025-01-08 PROCEDURE — 63600175 PHARM REV CODE 636 W HCPCS: Performed by: OPHTHALMOLOGY

## 2025-01-08 DEVICE — TECNIS SIMPLICITY TECNIS EYHANCE U 18.0D
Type: IMPLANTABLE DEVICE | Site: EYE | Status: FUNCTIONAL
Brand: TECNIS SIMPLICITY

## 2025-01-08 RX ORDER — CYCLOP/TROP/PROPA/PHEN/KET/WAT 1-1-0.1%
1 DROPS (EA) OPHTHALMIC (EYE)
Status: COMPLETED | OUTPATIENT
Start: 2025-01-08 | End: 2025-01-08

## 2025-01-08 RX ORDER — ACETAMINOPHEN 325 MG/1
650 TABLET ORAL EVERY 4 HOURS PRN
Status: DISCONTINUED | OUTPATIENT
Start: 2025-01-08 | End: 2025-01-08 | Stop reason: HOSPADM

## 2025-01-08 RX ORDER — TETRACAINE HYDROCHLORIDE 5 MG/ML
SOLUTION OPHTHALMIC
Status: DISCONTINUED | OUTPATIENT
Start: 2025-01-08 | End: 2025-01-08 | Stop reason: HOSPADM

## 2025-01-08 RX ORDER — LIDOCAINE HYDROCHLORIDE 10 MG/ML
INJECTION, SOLUTION EPIDURAL; INFILTRATION; INTRACAUDAL; PERINEURAL
Status: DISCONTINUED | OUTPATIENT
Start: 2025-01-08 | End: 2025-01-08 | Stop reason: HOSPADM

## 2025-01-08 RX ORDER — MOXIFLOXACIN 5 MG/ML
1 SOLUTION/ DROPS OPHTHALMIC
Status: COMPLETED | OUTPATIENT
Start: 2025-01-08 | End: 2025-01-08

## 2025-01-08 RX ORDER — VALSARTAN 320 MG/1
320 TABLET ORAL DAILY
COMMUNITY

## 2025-01-08 RX ORDER — MIDAZOLAM HYDROCHLORIDE 1 MG/ML
2 INJECTION, SOLUTION INTRAMUSCULAR; INTRAVENOUS
Status: DISCONTINUED | OUTPATIENT
Start: 2025-01-08 | End: 2025-01-08 | Stop reason: HOSPADM

## 2025-01-08 RX ORDER — PROPARACAINE HYDROCHLORIDE 5 MG/ML
1 SOLUTION/ DROPS OPHTHALMIC
Status: DISCONTINUED | OUTPATIENT
Start: 2025-01-08 | End: 2025-01-08 | Stop reason: HOSPADM

## 2025-01-08 RX ORDER — TETRACAINE HYDROCHLORIDE 5 MG/ML
1 SOLUTION OPHTHALMIC EVERY 5 MIN PRN
Status: DISCONTINUED | OUTPATIENT
Start: 2025-01-08 | End: 2025-01-08

## 2025-01-08 RX ORDER — LIDOCAINE HYDROCHLORIDE 40 MG/ML
INJECTION, SOLUTION RETROBULBAR
Status: DISCONTINUED | OUTPATIENT
Start: 2025-01-08 | End: 2025-01-08 | Stop reason: HOSPADM

## 2025-01-08 RX ORDER — PREDNISOLONE ACETATE 10 MG/ML
SUSPENSION/ DROPS OPHTHALMIC
Status: DISCONTINUED | OUTPATIENT
Start: 2025-01-08 | End: 2025-01-08 | Stop reason: HOSPADM

## 2025-01-08 RX ORDER — FENTANYL CITRATE 50 UG/ML
25 INJECTION, SOLUTION INTRAMUSCULAR; INTRAVENOUS EVERY 5 MIN PRN
Status: DISCONTINUED | OUTPATIENT
Start: 2025-01-08 | End: 2025-01-08 | Stop reason: HOSPADM

## 2025-01-08 RX ORDER — MOXIFLOXACIN 5 MG/ML
SOLUTION/ DROPS OPHTHALMIC
Status: DISCONTINUED | OUTPATIENT
Start: 2025-01-08 | End: 2025-01-08 | Stop reason: HOSPADM

## 2025-01-08 RX ADMIN — Medication 1 DROP: at 11:01

## 2025-01-08 RX ADMIN — MOXIFLOXACIN 1 DROP: 5 SOLUTION/ DROPS OPHTHALMIC at 12:01

## 2025-01-08 RX ADMIN — MOXIFLOXACIN OPHTHALMIC 1 DROP: 5 SOLUTION/ DROPS OPHTHALMIC at 11:01

## 2025-01-08 RX ADMIN — MIDAZOLAM HYDROCHLORIDE 2 MG: 1 INJECTION, SOLUTION INTRAMUSCULAR; INTRAVENOUS at 12:01

## 2025-01-08 NOTE — DISCHARGE INSTRUCTIONS
Dr. Mcnair     Cataract Post-Operative Instructions       Day of surgery:     -Resume drops THREE times daily into the operative eye.     -Do not rub your eye     -Wear protective sunglasses during the day.     -Resume moderate activity.     -Bathe/shower/wash face normally     -Do not apply makeup around the operative eye for 1 week.     -You should expect:     Blurry vision and halos for 24-48 hours     Dilated pupil for 24-48 hours     Scratchy feeling in the eye for 1-2 days     Curved shadow in your peripheral vision for 2-3 weeks     Occasional flickering of lights for up to 1 week     -If you experience severe pain or nausea, call Dr. Mcnair or the on-call doctor at [phone].       Plan to see Dr. Mcnair at:     [facility] [address]    [address] [place]  [address]    **Most patients can drive the next morning.  If you do not feel comfortable driving, please arrange for transportation. **

## 2025-01-08 NOTE — OP NOTE
DATE OF PROCEDURE: 01/08/2025    SURGEON: MEG PETERSON MD    PREOPERATIVE DIAGNOSIS:  Senile nuclear sclerotic cataract right eye.     POSTOPERATIVE DIAGNOSIS: Senile nuclear sclerotic cataract right eye.     PROCEDURE PERFORMED:  Phacoemulsification with placement of intraocular lens, right eye.    IMPLANT:  DIBOO  18.0    Anesthesia: Moderate sedation with topical Lidocaine. Patient ID confirmed and re-evaluated the patient and anesthesia plan confirming it is suitable for the patient's condition and procedure.     COMPLICATIONS: none    ESTIMATED BLOOD LOSS: <1cc    SPECIMENS: none    INDICATIONS FOR PROCEDURE:   The patient has a history of painless progressive vision loss.  The patient has described difficulties with activities of daily living, which specifically include driving, which is secondary to cataract formation and progression. After we had a thorough discussion about risks, benefits, and alternatives to cataract surgery, the patient agreed to proceed with phacoemulsification and implantation of a lens in the right eye.  These risks include, but are not limited to, hemorrhage, pain, infection, need for additional surgery, need for glasses or contacts, loss of vision, or even loss of the eye.    PROCEDURE IN DETAIL:  The patient was met in the preop holding area.  Consent was confirmed to be signed.  The operative site was marked.  The patient was brought into the operating room by the anesthesia team and placed under monitored anesthesia care.  The right eye was prepped and draped in a sterile ophthalmic fashion.  A Regina speculum was placed into the right eye.   A paracentesis site was made and 1% preservative-free lidocaine was injected into the anterior chamber.  Viscoelastic  material was injected into the anterior chamber.  A keratome blade was used to make a clear corneal incision.  A cystotome was used to initiate the continuous curvilinear capsulorrhexis which was completed with Utrata  forceps.  BSS on a bryan cannula was used to perform hydrodissection.  The phacoemulsification tip was introduced into the eye and the nucleus was removed in a standard divide-and-conquer fashion.  Remaining cortical material was removed from the eye using irrigation-aspiration.  The capsular bag was filled with viscoelastic material and the intraocular lens was injected and positioned into place. Remaining viscoelastic material was removed from the eye using irrigation and aspiration.  The corneal wounds were hydrated.  The eye was filled to physiologic pressure. The wounds were found to be watertight. Drops of Vigamox and prednisilone were placed into the eye.  The eye was washed, dried, and shielded.  The patient tolerated the procedure well and knows to follow up with me tomorrow morning, sooner if needed.      Under my direct supervision, intravenous moderate sedation was administered during the course of this procedure, with continuous monitoring of hemodynamic parameters.  Monitoring of the patient's vital signs and respiratory status was provided by trained nursing staff during the entire course of the procedure and under my supervision and recorded in the patient's medical record.  The total time for sedation was 14 minutes.

## 2025-01-08 NOTE — DISCHARGE SUMMARY
[facility]  Discharge Note  Short Stay    Procedure(s) (LRB):  EXTRACTION, CATARACT, WITH IOL INSERTION (Right)  BRIEF DISCHARGE NOTE:    Date of discharge: 01/08/2025    Reason for hospitalization -  Cataract surgery     Final Diagnosis - Visually significant Cataract    Procedures and treatment provided - Status post phacoemulsification with placement of intraocular lens     Diet - Advance to regular as tolerated    Activity - as tolerated    Disposition at the end of the case - Good.    Discharge: to home    The patient tolerated the procedure well and knows to follow up with me tomorrow in the eye clinic, sooner if needed.    Patient and family instructions (as appropriate) - Given to patient on discharge    Elif Mcnair MD

## 2025-01-10 ENCOUNTER — OFFICE VISIT (OUTPATIENT)
Dept: OPHTHALMOLOGY | Facility: CLINIC | Age: 80
End: 2025-01-10
Payer: MEDICARE

## 2025-01-10 DIAGNOSIS — H25.12 NUCLEAR SCLEROTIC CATARACT OF LEFT EYE: ICD-10-CM

## 2025-01-10 DIAGNOSIS — Z96.1 STATUS POST CATARACT EXTRACTION AND INSERTION OF INTRAOCULAR LENS, RIGHT: Primary | ICD-10-CM

## 2025-01-10 DIAGNOSIS — Z98.41 STATUS POST CATARACT EXTRACTION AND INSERTION OF INTRAOCULAR LENS, RIGHT: Primary | ICD-10-CM

## 2025-01-10 PROCEDURE — 99213 OFFICE O/P EST LOW 20 MIN: CPT | Mod: PBBFAC | Performed by: OPHTHALMOLOGY

## 2025-01-10 PROCEDURE — 99999 PR PBB SHADOW E&M-EST. PATIENT-LVL III: CPT | Mod: PBBFAC,,, | Performed by: OPHTHALMOLOGY

## 2025-01-10 NOTE — PROGRESS NOTES
HPI    Dr. Kendy Gambino OD    S/p Phacoemulsification with placement of intraocular lens, right eye   01/08/24  Piece of plastic or wood penetrated cornea 60+ yrs ago.  NSC OS  Dry amd OU    Eye Meds:  PMB TID OD    Patient is here today for 2 day phaco OD. Vision looks good. No pain or   discomfort.       Last edited by Lin Gonzalez on 1/10/2025 11:39 AM.            Assessment /Plan     For exam results, see Encounter Report.    Status post cataract extraction and insertion of intraocular lens, right    Nuclear sclerotic cataract of left eye  -     IOL Master - OU - Both Eyes          Slit Lamp Exam  L/L - normal  C/s - quiet  Cornea - clear  A/C - 1+ cell  Lens - PCIOL    POD #1 s/p phaco/IOL  - doing well  - continue the following drops:    vigamox or ocuflox TID x 1 wk then stop  Pred forte TID x  4 wks  Ketorolac TID until runs out    Versus:    Combination drop - 1 drop TID x total of 1 month    Appropriate precautions and post op medications reviewed.  Patient instructed to call or come in if symptoms of redness, decreased vision, or pain are experienced.    -f/up 1-2 wks, sooner PRN. Or 4 wks with [facility] for mrx if needed.                      Visually significant nuclear sclerotic cataract    - Cataracts are interfering with activities of daily living, including night time driving.  - Pt desires cataract surgery for visual rehabilitation.   - Risks / benefits/ alternatives were discussed and patient agrees to proceed with surgery.   - IOL options discussed as well, according to patient's goals and concomitant ocular pathology.  - Target: plano.       DIBOO  17.5 OS       Dry amd OU  - shayla blanco    Today's visit is associated with current and anticipated ongoing medical care related to this patient's single serious/complex condition (amd). Follow up is to be continued indefinitely to monitor the condition.

## 2025-01-16 ENCOUNTER — TELEPHONE (OUTPATIENT)
Dept: OPHTHALMOLOGY | Facility: CLINIC | Age: 80
End: 2025-01-16
Payer: MEDICARE

## 2025-01-16 NOTE — H&P
"History    Chief complaint:  Painless progressive vision loss    Present Ilness/Diagnosis: Nuclear sclerotic Cataract    Past Medical History:  has a past medical history of Hyperlipidemia, Hypertension, and Thyroid disease.    Family History/Social History: refer to chart    Allergies:   Review of patient's allergies indicates:   Allergen Reactions    Aspirin      "chest burns"      Lisinopril Other (See Comments)     Coughing       Current Medications: No current facility-administered medications for this encounter.    Current Outpatient Medications:     aspirin (ECOTRIN) 81 MG EC tablet, Take 1 tablet (81 mg total) by mouth 2 (two) times a day., Disp: 60 tablet, Rfl: 0    atorvastatin (LIPITOR) 20 MG tablet, Take by mouth every evening., Disp: , Rfl:     cetirizine (ZYRTEC) 10 MG tablet, Take by mouth nightly as needed., Disp: , Rfl:     chlorthalidone (HYGROTEN) 25 MG Tab, Take 25 mg by mouth nightly., Disp: , Rfl:     docusate sodium (COLACE) 100 MG capsule, Take 1 capsule (100 mg total) by mouth 2 (two) times daily as needed for Constipation. Take this medication with prescription pain medicine, Disp: 60 capsule, Rfl: 0    fluticasone propionate (FLONASE) 50 mcg/actuation nasal spray, SHAKE LQ AND U 2 SPRAYS IEN QD UTD, Disp: , Rfl:     levothyroxine (SYNTHROID) 50 MCG tablet, Take by mouth before breakfast., Disp: , Rfl:     loratadine (CLARITIN) 10 mg tablet, Take 10 mg by mouth nightly as needed for Allergies., Disp: , Rfl:     metoprolol succinate (TOPROL-XL) 50 MG 24 hr tablet, Take by mouth every evening., Disp: , Rfl:     omeprazole (PRILOSEC) 20 MG capsule, Take by mouth every morning., Disp: , Rfl:     ondansetron (ZOFRAN-ODT) 4 MG TbDL, Take 1 tablet (4 mg total) by mouth every 8 (eight) hours as needed (nausea or vomiting)., Disp: 12 tablet, Rfl: 0    oxyCODONE (ROXICODONE) 5 MG immediate release tablet, Take 1 tablet (5 mg total) by mouth every 6 (six) hours as needed for Pain. May take 1-2 tablets " every 6 hours as needed for pain, Disp: 30 tablet, Rfl: 0    prednisoLONE-moxiflox-bromfen 1-0.5-0.075 % DrpS, Apply 1 drop to eye 3 (three) times daily., Disp: 8 mL, Rfl: 3    prednisoLONE-moxiflox-bromfen 1-0.5-0.075 % DrpS, Apply 1 drop to eye 3 (three) times daily., Disp: 8 mL, Rfl: 3    sertraline (ZOLOFT) 50 MG tablet, Take by mouth every evening., Disp: , Rfl:     valsartan (DIOVAN) 320 MG tablet, Take 320 mg by mouth once daily., Disp: , Rfl:     Facility-Administered Medications Ordered in Other Encounters:     fentaNYL 50 mcg/mL injection  mcg,  mcg, Intravenous, PRN, oJse Bailey MD    midazolam (VERSED) 1 mg/mL injection 0.5-4 mg, 0.5-4 mg, Intravenous, PRN, Jose Bailey MD, 2 mg at 07/25/22 0905    ASA Score: II     Mallampati Score: II     Plan for Sedation: Moderate Sedation     Patient or Family History of Anesthesia problems : No    Physical Exam    BP: Vital signs stable  General: No apparent distress  HEENT: nuclear sclerotic cataract  Lungs: adequate respirations  Heart: + pulses  Abdomen: soft  Rectal/pelvic: deferred    Impression: Visually significant Cataract.    See previous clinic notes for surgical indications.    Plan: Phacoemulsification with implantation of Intraocular lens

## 2025-01-16 NOTE — TELEPHONE ENCOUNTER
LV with arrival time of 11:00 for sx on 1/22/25 with Dr. Mcnair @ [place]. Sent message to my chart

## 2025-02-04 ENCOUNTER — TELEPHONE (OUTPATIENT)
Dept: OPHTHALMOLOGY | Facility: CLINIC | Age: 80
End: 2025-02-04
Payer: MEDICARE

## 2025-02-04 NOTE — H&P
"History    Chief complaint:  Painless progressive vision loss    Present Ilness/Diagnosis: Nuclear sclerotic Cataract    Past Medical History:  has a past medical history of Hyperlipidemia, Hypertension, and Thyroid disease.    Family History/Social History: refer to chart    Allergies:   Review of patient's allergies indicates:   Allergen Reactions    Aspirin      "chest burns"      Lisinopril Other (See Comments)     Coughing       Current Medications: No current facility-administered medications for this encounter.    Current Outpatient Medications:     aspirin (ECOTRIN) 81 MG EC tablet, Take 1 tablet (81 mg total) by mouth 2 (two) times a day., Disp: 60 tablet, Rfl: 0    atorvastatin (LIPITOR) 20 MG tablet, Take by mouth every evening., Disp: , Rfl:     cetirizine (ZYRTEC) 10 MG tablet, Take by mouth nightly as needed., Disp: , Rfl:     chlorthalidone (HYGROTEN) 25 MG Tab, Take 25 mg by mouth nightly., Disp: , Rfl:     docusate sodium (COLACE) 100 MG capsule, Take 1 capsule (100 mg total) by mouth 2 (two) times daily as needed for Constipation. Take this medication with prescription pain medicine, Disp: 60 capsule, Rfl: 0    fluticasone propionate (FLONASE) 50 mcg/actuation nasal spray, SHAKE LQ AND U 2 SPRAYS IEN QD UTD, Disp: , Rfl:     levothyroxine (SYNTHROID) 50 MCG tablet, Take by mouth before breakfast., Disp: , Rfl:     loratadine (CLARITIN) 10 mg tablet, Take 10 mg by mouth nightly as needed for Allergies., Disp: , Rfl:     metoprolol succinate (TOPROL-XL) 50 MG 24 hr tablet, Take by mouth every evening., Disp: , Rfl:     omeprazole (PRILOSEC) 20 MG capsule, Take by mouth every morning., Disp: , Rfl:     ondansetron (ZOFRAN-ODT) 4 MG TbDL, Take 1 tablet (4 mg total) by mouth every 8 (eight) hours as needed (nausea or vomiting)., Disp: 12 tablet, Rfl: 0    oxyCODONE (ROXICODONE) 5 MG immediate release tablet, Take 1 tablet (5 mg total) by mouth every 6 (six) hours as needed for Pain. May take 1-2 tablets " every 6 hours as needed for pain, Disp: 30 tablet, Rfl: 0    prednisoLONE-moxiflox-bromfen 1-0.5-0.075 % DrpS, Apply 1 drop to eye 3 (three) times daily., Disp: 8 mL, Rfl: 3    prednisoLONE-moxiflox-bromfen 1-0.5-0.075 % DrpS, Apply 1 drop to eye 3 (three) times daily., Disp: 8 mL, Rfl: 3    sertraline (ZOLOFT) 50 MG tablet, Take by mouth every evening., Disp: , Rfl:     valsartan (DIOVAN) 320 MG tablet, Take 320 mg by mouth once daily., Disp: , Rfl:     Facility-Administered Medications Ordered in Other Encounters:     fentaNYL 50 mcg/mL injection  mcg,  mcg, Intravenous, PRN, Jose Bailey MD    midazolam (VERSED) 1 mg/mL injection 0.5-4 mg, 0.5-4 mg, Intravenous, PRN, Jose Bailey MD, 2 mg at 07/25/22 0905    ASA Score: II     Mallampati Score: II     Plan for Sedation: Moderate Sedation     Patient or Family History of Anesthesia problems : No    Physical Exam    BP: Vital signs stable  General: No apparent distress  HEENT: nuclear sclerotic cataract  Lungs: adequate respirations  Heart: + pulses  Abdomen: soft  Rectal/pelvic: deferred    Impression: Visually significant Cataract.    See previous clinic notes for surgical indications.    Plan: Phacoemulsification with implantation of Intraocular lens

## 2025-02-06 NOTE — PRE-PROCEDURE INSTRUCTIONS
Patient reviewed on 2/6/2025.  Okay to proceed at [facility]. The following pre-procedure instructions and arrival time have been reviewed with patient via phone and sent to patient portal for review.  Patient verbalized an understanding.  Pt to be accompanied by her daughter day of procedure as responsible .    Dear Brunilda     Below you will find basic pre-procedure instructions in preparation for your procedure on 2/7/25 with Dr. Mcnair        You should receive your arrival time within 24-48 hours of your scheduled procedure date from the  at your surgeon's office.     -Nothing to eat or drink after midnight the night before your procedure until after your procedure, except AM meds with small sips of water.     - HOLD all oral Diabetic medications night before and morning of procedure  - HOLD all Insulin morning of procedure  - HOLD all Fluid pills morning of procedure  - HOLD all non-insulin shots until after surgery (Ozempic, Mounjaro, Trulicity, Victoza, Byetta, Wegovy and Adlyxin) (7 days prior)  - HOLD all vitamins, minerals and herbal supplements morning of procedure   - TAKE all B/P meds, EXCEPT those that contain a fluid pill (ex. Lasix, Hydroclorothiazide/HCTZ, Spirnolactone)  - USE inhalers as needed and bring AM of surgery  - USE EYE DROPS as directed  -TAKE blood thinner meds AM of surgery unless otherwise instructed by your provider to not take     - Shower and wash face with antibacterial soap (ex. Dial) for 3 mins PM prior and AM of surgery  - No powder, lotions, creams, oils, gels, ointments, makeup,  or jewelry    - Wear comfortable clothing (button up shirt)     (Patient is required to have a responsible ride to transport home, ride may not leave while patient is in surgery)     -- [facility], 2nd floor Surgery Center, located   @ [address] [place] [address]  2nd Floor Registration        If you have any questions or concerns please feel free  to contact your surgeon's office.     In the event that you are running late or need to reschedule on the day of your procedure, please contact the pre-op desk at [phone].          Please reply to this message as receipt of delivery.     [place], LPN [facility]  Pre-Admit - Anesthesia Dept

## 2025-02-07 ENCOUNTER — OFFICE VISIT (OUTPATIENT)
Dept: OPHTHALMOLOGY | Facility: CLINIC | Age: 80
End: 2025-02-07
Payer: MEDICARE

## 2025-02-07 ENCOUNTER — HOSPITAL ENCOUNTER (OUTPATIENT)
Facility: HOSPITAL | Age: 80
Discharge: HOME | End: 2025-02-07
Attending: OPHTHALMOLOGY | Admitting: OPHTHALMOLOGY
Payer: MEDICARE

## 2025-02-07 VITALS
RESPIRATION RATE: 14 BRPM | HEART RATE: 70 BPM | HEIGHT: 60 IN | DIASTOLIC BLOOD PRESSURE: 61 MMHG | OXYGEN SATURATION: 96 % | WEIGHT: 162 LBS | BODY MASS INDEX: 31.8 KG/M2 | TEMPERATURE: 98 F | SYSTOLIC BLOOD PRESSURE: 124 MMHG

## 2025-02-07 DIAGNOSIS — Z98.42 STATUS POST CATARACT EXTRACTION AND INSERTION OF INTRAOCULAR LENS, LEFT: Primary | ICD-10-CM

## 2025-02-07 DIAGNOSIS — H25.12 NUCLEAR SCLEROTIC CATARACT OF LEFT EYE: Primary | ICD-10-CM

## 2025-02-07 DIAGNOSIS — H25.12 AGE-RELATED NUCLEAR CATARACT, LEFT: ICD-10-CM

## 2025-02-07 DIAGNOSIS — Z96.1 STATUS POST CATARACT EXTRACTION AND INSERTION OF INTRAOCULAR LENS, LEFT: Primary | ICD-10-CM

## 2025-02-07 PROCEDURE — 71000015 HC POSTOP RECOV 1ST HR: Performed by: OPHTHALMOLOGY

## 2025-02-07 PROCEDURE — 99024 POSTOP FOLLOW-UP VISIT: CPT | Mod: POP,,, | Performed by: OPHTHALMOLOGY

## 2025-02-07 PROCEDURE — 99152 MOD SED SAME PHYS/QHP 5/>YRS: CPT | Mod: ,,, | Performed by: OPHTHALMOLOGY

## 2025-02-07 PROCEDURE — 36000706: Performed by: OPHTHALMOLOGY

## 2025-02-07 PROCEDURE — 99900035 HC TECH TIME PER 15 MIN (STAT)

## 2025-02-07 PROCEDURE — 99212 OFFICE O/P EST SF 10 MIN: CPT | Mod: PBBFAC | Performed by: OPHTHALMOLOGY

## 2025-02-07 PROCEDURE — 99152 MOD SED SAME PHYS/QHP 5/>YRS: CPT | Performed by: OPHTHALMOLOGY

## 2025-02-07 PROCEDURE — 36000707: Performed by: OPHTHALMOLOGY

## 2025-02-07 PROCEDURE — 94761 N-INVAS EAR/PLS OXIMETRY MLT: CPT

## 2025-02-07 PROCEDURE — 27201423 OPTIME MED/SURG SUP & DEVICES STERILE SUPPLY: Performed by: OPHTHALMOLOGY

## 2025-02-07 PROCEDURE — 25000003 PHARM REV CODE 250: Performed by: OPHTHALMOLOGY

## 2025-02-07 PROCEDURE — 66984 XCAPSL CTRC RMVL W/O ECP: CPT | Mod: 79,LT,, | Performed by: OPHTHALMOLOGY

## 2025-02-07 PROCEDURE — 99999 PR PBB SHADOW E&M-EST. PATIENT-LVL II: CPT | Mod: PBBFAC,,, | Performed by: OPHTHALMOLOGY

## 2025-02-07 PROCEDURE — V2632 POST CHMBR INTRAOCULAR LENS: HCPCS | Performed by: OPHTHALMOLOGY

## 2025-02-07 PROCEDURE — 63600175 PHARM REV CODE 636 W HCPCS: Performed by: OPHTHALMOLOGY

## 2025-02-07 DEVICE — TECNIS SIMPLICITY TECNIS EYHANCE U 17.5D
Type: IMPLANTABLE DEVICE | Site: EYE | Status: FUNCTIONAL
Brand: TECNIS SIMPLICITY

## 2025-02-07 RX ORDER — MOXIFLOXACIN 5 MG/ML
1 SOLUTION/ DROPS OPHTHALMIC
Status: COMPLETED | OUTPATIENT
Start: 2025-02-07 | End: 2025-02-07

## 2025-02-07 RX ORDER — PREDNISOLONE ACETATE 10 MG/ML
SUSPENSION/ DROPS OPHTHALMIC
Status: DISCONTINUED | OUTPATIENT
Start: 2025-02-07 | End: 2025-02-07 | Stop reason: HOSPADM

## 2025-02-07 RX ORDER — PROPARACAINE HYDROCHLORIDE 5 MG/ML
SOLUTION/ DROPS OPHTHALMIC
Status: DISCONTINUED | OUTPATIENT
Start: 2025-02-07 | End: 2025-02-07 | Stop reason: HOSPADM

## 2025-02-07 RX ORDER — PROPARACAINE HYDROCHLORIDE 5 MG/ML
1 SOLUTION/ DROPS OPHTHALMIC
Status: DISCONTINUED | OUTPATIENT
Start: 2025-02-07 | End: 2025-02-07 | Stop reason: HOSPADM

## 2025-02-07 RX ORDER — CYCLOP/TROP/PROPA/PHEN/KET/WAT 1-1-0.1%
1 DROPS (EA) OPHTHALMIC (EYE)
Status: COMPLETED | OUTPATIENT
Start: 2025-02-07 | End: 2025-02-07

## 2025-02-07 RX ORDER — ACETAMINOPHEN 325 MG/1
650 TABLET ORAL EVERY 4 HOURS PRN
Status: DISCONTINUED | OUTPATIENT
Start: 2025-02-07 | End: 2025-02-07 | Stop reason: HOSPADM

## 2025-02-07 RX ORDER — MOXIFLOXACIN 5 MG/ML
SOLUTION/ DROPS OPHTHALMIC
Status: DISCONTINUED | OUTPATIENT
Start: 2025-02-07 | End: 2025-02-07 | Stop reason: HOSPADM

## 2025-02-07 RX ORDER — LIDOCAINE HYDROCHLORIDE 40 MG/ML
INJECTION, SOLUTION RETROBULBAR
Status: DISCONTINUED | OUTPATIENT
Start: 2025-02-07 | End: 2025-02-07 | Stop reason: HOSPADM

## 2025-02-07 RX ORDER — FENTANYL CITRATE 50 UG/ML
25 INJECTION, SOLUTION INTRAMUSCULAR; INTRAVENOUS EVERY 5 MIN PRN
Status: DISCONTINUED | OUTPATIENT
Start: 2025-02-07 | End: 2025-02-07 | Stop reason: HOSPADM

## 2025-02-07 RX ORDER — MIDAZOLAM HYDROCHLORIDE 1 MG/ML
2 INJECTION, SOLUTION INTRAMUSCULAR; INTRAVENOUS
Status: DISCONTINUED | OUTPATIENT
Start: 2025-02-07 | End: 2025-02-07 | Stop reason: HOSPADM

## 2025-02-07 RX ORDER — TETRACAINE HYDROCHLORIDE 5 MG/ML
1 SOLUTION OPHTHALMIC EVERY 5 MIN PRN
Status: DISCONTINUED | OUTPATIENT
Start: 2025-02-07 | End: 2025-02-07

## 2025-02-07 RX ORDER — LIDOCAINE HYDROCHLORIDE 10 MG/ML
INJECTION, SOLUTION EPIDURAL; INFILTRATION; INTRACAUDAL; PERINEURAL
Status: DISCONTINUED | OUTPATIENT
Start: 2025-02-07 | End: 2025-02-07 | Stop reason: HOSPADM

## 2025-02-07 RX ADMIN — MOXIFLOXACIN OPHTHALMIC 1 DROP: 5 SOLUTION/ DROPS OPHTHALMIC at 10:02

## 2025-02-07 RX ADMIN — MIDAZOLAM HYDROCHLORIDE 2 MG: 1 INJECTION, SOLUTION INTRAMUSCULAR; INTRAVENOUS at 10:02

## 2025-02-07 RX ADMIN — MOXIFLOXACIN 1 DROP: 5 SOLUTION/ DROPS OPHTHALMIC at 11:02

## 2025-02-07 RX ADMIN — Medication 1 DROP: at 10:02

## 2025-02-07 NOTE — OP NOTE
DATE OF PROCEDURE: 02/07/2025    SURGEON: MEG PETERSON MD    PREOPERATIVE DIAGNOSIS:  Senile nuclear sclerotic cataract left eye.     POSTOPERATIVE DIAGNOSIS: Senile nuclear sclerotic cataract left eye.     PROCEDURE PERFORMED:  Phacoemulsification with placement of intraocular lens, left eye.    IMPLANT:  DIB00 17.5    Anesthesia: Moderate sedation with topical Lidocaine. Patient ID confirmed and re-evaluated the patient and anesthesia plan confirming it is suitable for the patient's condition and procedure.     COMPLICATIONS: none    ESTIMATED BLOOD LOSS: <1cc    SPECIMENS: none    INDICATIONS FOR PROCEDURE:   The patient has a history of painless progressive vision loss.  The patient has described difficulties with activities of daily living, which specifically include driving, which is secondary to cataract formation and progression. After we had a thorough discussion about risks, benefits, and alternatives to cataract surgery, the patient agreed to proceed with phacoemulsification and implantation of a lens in the left eye.  These risks include, but are not limited to, hemorrhage, pain, infection, need for additional surgery, need for glasses or contacts, loss of vision, or even loss of the eye.    PROCEDURE IN DETAIL:  The patient was met in the preop holding area.  Consent was confirmed to be signed.  The operative site was marked.  The patient was brought into the operating room by the anesthesia team and placed under monitored anesthesia care.  The left eye was prepped and draped in a sterile ophthalmic fashion.  A Regina speculum was placed into the left eye.   A paracentesis site was made and 1% preservative-free lidocaine was injected into the anterior chamber.  Viscoelastic  material was injected into the anterior chamber.  A keratome blade was used to make a clear corneal incision.  A cystotome was used to initiate the continuous curvilinear capsulorrhexis which was completed with Utrata forceps.   BSS on a bryan cannula was used to perform hydrodissection.  The phacoemulsification tip was introduced into the eye and the nucleus was removed in a standard divide-and-conquer fashion.  Remaining cortical material was removed from the eye using irrigation-aspiration.  The capsular bag was filled with viscoelastic material and the intraocular lens was injected and positioned into place. Remaining viscoelastic material was removed from the eye using irrigation and aspiration.  The corneal wounds were hydrated.  The eye was filled to physiologic pressure. The wounds were found to be watertight. Drops of Vigamox and prednisilone were placed into the eye.  The eye was washed, dried, and shielded.  The patient tolerated the procedure well and knows to follow up with me tomorrow morning, sooner if needed.      Under my direct supervision, intravenous moderate sedation was administered during the course of this procedure, with continuous monitoring of hemodynamic parameters.  Monitoring of the patient's vital signs and respiratory status was provided by trained nursing staff during the entire course of the procedure and under my supervision and recorded in the patient's medical record.  The total time for sedation was 11 minutes.

## 2025-02-07 NOTE — DISCHARGE SUMMARY
Anesthesia Post-op Note    Patient: Mary Beth Kimble  Procedure(s) Performed: ROBOTIC ASSISTED TOTAL LAPAROSCOPIC HYSTERECTOMY, BILATERAL SALPINGECTOMY AND CYSTOSCOPY. (Abdomen)  Anesthesia type: General    Vitals Value Taken Time   Temp 36.9 02/27/24 1034   Pulse 73 02/27/24 1033   Resp 17 02/27/24 1033   SpO2 100 % 02/27/24 1033   /63 02/27/24 1031   Vitals shown include unvalidated device data.      Patient Location: PACU Phase 1  Post-op Vital Signs:stable  Level of Consciousness: sedated  Respiratory Status: spontaneous ventilation, oral airway and face mask  Cardiovascular stable  Hydration: euvolemic  Pain Management: adequately controlled  Handoff: Handoff to receiving clinician was performed and questions were answered  Vomiting: none  Nausea: None  Airway Patency:oral airway  Post-op Assessment: no complications and patient tolerated procedure well      No notable events documented.                       [facility]  Discharge Note  Short Stay    Procedure(s) (LRB):  EXTRACTION, CATARACT, WITH IOL INSERTION (Left)  BRIEF DISCHARGE NOTE:    Date of discharge: 02/07/2025    Reason for hospitalization -  Cataract surgery     Final Diagnosis - Visually significant Cataract    Procedures and treatment provided - Status post phacoemulsification with placement of intraocular lens     Diet - Advance to regular as tolerated    Activity - as tolerated    Disposition at the end of the case - Good.    Discharge: to home    The patient tolerated the procedure well and knows to follow up with me tomorrow in the eye clinic, sooner if needed.    Patient and family instructions (as appropriate) - Given to patient on discharge    Elif Mcnair MD

## 2025-02-07 NOTE — DISCHARGE INSTRUCTIONS
Dr. Mcnair     Cataract Post-Operative Instructions       Day of surgery:     -Resume drops THREE times daily into the operative eye.     -Do not rub your eye     -Wear protective sunglasses during the day.     -Resume moderate activity.     -Bathe/shower/wash face normally     -Do not apply makeup around the operative eye for 1 week.     -You should expect:     Blurry vision and halos for 24-48 hours     Dilated pupil for 24-48 hours     Scratchy feeling in the eye for 1-2 days     Curved shadow in your peripheral vision for 2-3 weeks     Occasional flickering of lights for up to 1 week     -If you experience severe pain or nausea, call Dr. Mcnair or the on-call doctor at [phone].       Plan to see Dr. Mcnair at:     [facility] [address]    [address] [place]  [address]    **Most patients can drive the next morning.  If you do not feel comfortable driving, please arrange for transportation. **

## 2025-02-12 NOTE — PROGRESS NOTES
HPI     Post-op Evaluation     Additional comments: Same day phaco OS           Comments    Dr. Kendy Gambino OD     S/p phaco OD 01/08/24   S/p phaco OS 2/7/25  Piece of plastic or wood penetrated cornea 60+ yrs ago.   Dry AMD OU     PMB TID OS    Patient here for same day phaco OS. Patient aware she had surgery OS-feels   funny.          Last edited by Karla Robertson MA on 2/7/2025  1:33 PM.            Assessment /Plan     For exam results, see Encounter Report.    Status post cataract extraction and insertion of intraocular lens, left      Slit Lamp Exam  L/L - normal  C/s - quiet  Cornea - clear  A/C - 1+ cell  Lens - PCIOL    POD #1 s/p phaco/IOL  - doing well  - continue the following drops:    vigamox or ocuflox TID x 1 wk then stop  Pred forte TID x  4 wks  Ketorolac TID until runs out    Versus:    Combination drop - 1 drop TID x total of 1 month    Appropriate precautions and post op medications reviewed.  Patient instructed to call or come in if symptoms of redness, decreased vision, or pain are experienced.    -f/up 1-2 wks, sooner PRN. Or 4 wks with optom for mrx if needed.

## 2025-02-13 ENCOUNTER — TELEPHONE (OUTPATIENT)
Dept: OPHTHALMOLOGY | Facility: CLINIC | Age: 80
End: 2025-02-13
Payer: MEDICARE

## 2025-02-13 DIAGNOSIS — H25.12 NUCLEAR SCLEROTIC CATARACT OF LEFT EYE: Primary | ICD-10-CM

## 2025-03-13 ENCOUNTER — TELEPHONE (OUTPATIENT)
Dept: OPHTHALMOLOGY | Facility: CLINIC | Age: 80
End: 2025-03-13
Payer: MEDICARE

## 2025-03-13 NOTE — TELEPHONE ENCOUNTER
Spoke with pt provided arrival time of 10:30 for sx on 3/19/25 with Dr. Mcnair @ [place]. Pt has eyedrops and packet.

## 2025-03-17 ENCOUNTER — TELEPHONE (OUTPATIENT)
Dept: OPTOMETRY | Facility: CLINIC | Age: 80
End: 2025-03-17
Payer: MEDICARE

## 2025-03-18 ENCOUNTER — OFFICE VISIT (OUTPATIENT)
Dept: OPTOMETRY | Facility: CLINIC | Age: 80
End: 2025-03-18
Payer: MEDICARE

## 2025-03-18 DIAGNOSIS — Z96.1 STATUS POST CATARACT EXTRACTION OF BOTH EYES WITH INSERTION OF INTRAOCULAR LENS: Primary | ICD-10-CM

## 2025-03-18 DIAGNOSIS — Z98.41 STATUS POST CATARACT EXTRACTION OF BOTH EYES WITH INSERTION OF INTRAOCULAR LENS: Primary | ICD-10-CM

## 2025-03-18 DIAGNOSIS — Z98.42 STATUS POST CATARACT EXTRACTION OF BOTH EYES WITH INSERTION OF INTRAOCULAR LENS: Primary | ICD-10-CM

## 2025-03-18 PROCEDURE — 99024 POSTOP FOLLOW-UP VISIT: CPT | Mod: POP,,, | Performed by: OPTOMETRIST

## 2025-03-18 PROCEDURE — 99999 PR PBB SHADOW E&M-EST. PATIENT-LVL III: CPT | Mod: PBBFAC,,, | Performed by: OPTOMETRIST

## 2025-03-18 PROCEDURE — 99213 OFFICE O/P EST LOW 20 MIN: CPT | Mod: PBBFAC | Performed by: OPTOMETRIST

## 2025-03-18 RX ORDER — PREDNISOLONE/MOXIFLOX/BROMF/PF 1 %-0.5 %
DROPS OPHTHALMIC (EYE)
COMMUNITY
Start: 2025-01-31

## 2025-03-18 NOTE — PROGRESS NOTES
HPI    Pt is here today for post op. Feels as though she may have something in OS   and has occasional sharp pains OS.  DLS: 2/7/2025 Dr. Mcnair   (-)Flashes   (+)Floaters   (-)Diplopia   (-)Headaches   (-)Itching   (-)Tearing  (-)Burning  (+)Dryness   (+)Photophobia  (-)Glare   (-)Blurred VA  Past Eye Sx: Cataract with IOL OU  Eye Meds: (-)   Last edited by Lin Gee, OD on 3/18/2025 11:01 AM.            Assessment /Plan     For exam results, see Encounter Report.    Status post cataract extraction of both eyes with insertion of intraocular lens      Educated on today's WNL findings OU. Eyes well healed from cataract surgery OU. Pt OK to use OTC readers.     RTC in 1 year for annual eye exam unless changes noted sooner.

## (undated) DEVICE — SUT ETHILON 3/0 18IN PS-1

## (undated) DEVICE — DRESSING ADAPTIC TOUCH 3X2

## (undated) DEVICE — DRAPE STERI U-SHAPED 47X51IN

## (undated) DEVICE — SUT VICRYL PLUS 3-0 SH 18IN

## (undated) DEVICE — SEE MEDLINE ITEM 157150

## (undated) DEVICE — SPONGE LAP 18X18 PREWASHED

## (undated) DEVICE — GAUZE SPONGE 4X4 12PLY

## (undated) DEVICE — SEE MEDLINE ITEM 157216

## (undated) DEVICE — SEE MEDLINE ITEM 157131

## (undated) DEVICE — DRAPE OPHTHALMIC 48X62 FEN

## (undated) DEVICE — SUT 5 30IN ETHIBOND GRN BR

## (undated) DEVICE — DRESSING AQUACEL AG 3.5X10IN

## (undated) DEVICE — DRAPE C-ARMOR EQUIPMENT COVER

## (undated) DEVICE — CATH SUCTION 10FR

## (undated) DEVICE — BANDAGE ESMARK 6X12

## (undated) DEVICE — TIP YANKAUERS BULB NO VENT

## (undated) DEVICE — KIT PREVENA PLUS

## (undated) DEVICE — APPLICATOR CHLORAPREP ORN 26ML

## (undated) DEVICE — WIRE COMPRESSION 1.6MM X 150MM
Type: IMPLANTABLE DEVICE | Site: PATELLA | Status: NON-FUNCTIONAL
Removed: 2022-07-25

## (undated) DEVICE — SEE MEDLINE ITEM 146298

## (undated) DEVICE — K-WIRE TRCR PT1.6MM DIA 150MM
Type: IMPLANTABLE DEVICE | Site: KNEE | Status: NON-FUNCTIONAL
Removed: 2022-06-13

## (undated) DEVICE — DRAPE PLASTIC U 60X72

## (undated) DEVICE — BIT DRILL 2.7.

## (undated) DEVICE — Device

## (undated) DEVICE — GLOVE BIOGEL ECLIPSE SZ 6.5

## (undated) DEVICE — BNDG COFLEX FOAM LF2 ST 6X5YD

## (undated) DEVICE — DRAPE C ARM 42 X 120 10/BX

## (undated) DEVICE — TAPE SURG DURAPORE 2 X10YD

## (undated) DEVICE — TOWEL OR DISP STRL BLUE 4/PK

## (undated) DEVICE — DRAPE THREE-QTR REINF 53X77IN

## (undated) DEVICE — TOURNIQUET SB QC DP 34X4IN

## (undated) DEVICE — DRESSING TRANS 2X2 TEGADERM

## (undated) DEVICE — TRAY MINOR ORTHO

## (undated) DEVICE — BIT DRILL 2.0MM D DEPTH 110MM

## (undated) DEVICE — DRAPE INCISE IOBAN 2 13X13IN

## (undated) DEVICE — KWIRE NTHRD 1.25X150MM
Type: IMPLANTABLE DEVICE | Site: KNEE | Status: NON-FUNCTIONAL
Removed: 2022-06-13

## (undated) DEVICE — SUT VICRYL PLUS 0 CT1 18IN

## (undated) DEVICE — SYR LUER LOCK 1CC

## (undated) DEVICE — DRAPE U SPLIT SHEET 54X76IN

## (undated) DEVICE — ELECTRODE REM PLYHSV RETURN 9

## (undated) DEVICE — SOL BETADINE 5%

## (undated) DEVICE — DUOVISC

## (undated) DEVICE — GAUZE AVANT SPNG 4PLY STRL 4X4

## (undated) DEVICE — DRAPE TOP 53X102IN

## (undated) DEVICE — K-WIRE TRCR PT1.6MM DIA 150MM
Type: IMPLANTABLE DEVICE | Site: PATELLA | Status: NON-FUNCTIONAL
Removed: 2022-07-25

## (undated) DEVICE — DRAPE T EXTRM SURG 121X128X90